# Patient Record
Sex: FEMALE | Race: WHITE | NOT HISPANIC OR LATINO | Employment: FULL TIME | URBAN - METROPOLITAN AREA
[De-identification: names, ages, dates, MRNs, and addresses within clinical notes are randomized per-mention and may not be internally consistent; named-entity substitution may affect disease eponyms.]

---

## 2023-04-26 DIAGNOSIS — K21.9 GASTROESOPHAGEAL REFLUX DISEASE WITHOUT ESOPHAGITIS: ICD-10-CM

## 2023-04-26 DIAGNOSIS — I10 HTN (HYPERTENSION), BENIGN: Primary | ICD-10-CM

## 2023-04-26 RX ORDER — OMEPRAZOLE 20 MG/1
20 CAPSULE, DELAYED RELEASE ORAL DAILY
Qty: 90 CAPSULE | Refills: 0 | Status: SHIPPED | OUTPATIENT
Start: 2023-04-26 | End: 2023-06-06 | Stop reason: SDUPTHER

## 2023-04-26 RX ORDER — AMLODIPINE AND BENAZEPRIL HYDROCHLORIDE 5; 20 MG/1; MG/1
1 CAPSULE ORAL DAILY
Qty: 90 CAPSULE | Refills: 0 | Status: SHIPPED | OUTPATIENT
Start: 2023-04-26 | End: 2023-06-06 | Stop reason: SDUPTHER

## 2023-06-06 DIAGNOSIS — I10 HTN (HYPERTENSION), BENIGN: ICD-10-CM

## 2023-06-06 DIAGNOSIS — K21.9 GASTROESOPHAGEAL REFLUX DISEASE WITHOUT ESOPHAGITIS: ICD-10-CM

## 2023-06-06 RX ORDER — AMLODIPINE AND BENAZEPRIL HYDROCHLORIDE 5; 20 MG/1; MG/1
1 CAPSULE ORAL DAILY
Qty: 90 CAPSULE | Refills: 0 | Status: SHIPPED | OUTPATIENT
Start: 2023-06-06 | End: 2023-07-24 | Stop reason: SDUPTHER

## 2023-06-06 RX ORDER — OMEPRAZOLE 20 MG/1
20 CAPSULE, DELAYED RELEASE ORAL DAILY
Qty: 90 CAPSULE | Refills: 0 | Status: SHIPPED | OUTPATIENT
Start: 2023-06-06 | End: 2023-07-24 | Stop reason: SDUPTHER

## 2023-07-24 ENCOUNTER — OFFICE VISIT (OUTPATIENT)
Dept: PRIMARY CARE | Facility: CLINIC | Age: 58
End: 2023-07-24
Payer: COMMERCIAL

## 2023-07-24 VITALS
BODY MASS INDEX: 36.98 KG/M2 | HEART RATE: 65 BPM | TEMPERATURE: 97.3 F | WEIGHT: 202.2 LBS | DIASTOLIC BLOOD PRESSURE: 88 MMHG | SYSTOLIC BLOOD PRESSURE: 138 MMHG

## 2023-07-24 DIAGNOSIS — R09.82 POSTNASAL DRIP: ICD-10-CM

## 2023-07-24 DIAGNOSIS — F41.9 ANXIETY DISORDER, UNSPECIFIED TYPE: ICD-10-CM

## 2023-07-24 DIAGNOSIS — I10 HTN (HYPERTENSION), BENIGN: Primary | ICD-10-CM

## 2023-07-24 DIAGNOSIS — G47.33 OSA (OBSTRUCTIVE SLEEP APNEA): ICD-10-CM

## 2023-07-24 DIAGNOSIS — Z12.31 ENCOUNTER FOR SCREENING MAMMOGRAM FOR BREAST CANCER: ICD-10-CM

## 2023-07-24 DIAGNOSIS — E78.5 HYPERLIPIDEMIA, UNSPECIFIED HYPERLIPIDEMIA TYPE: ICD-10-CM

## 2023-07-24 DIAGNOSIS — K21.9 GASTROESOPHAGEAL REFLUX DISEASE WITHOUT ESOPHAGITIS: ICD-10-CM

## 2023-07-24 PROBLEM — K44.9 HIATAL HERNIA: Status: ACTIVE | Noted: 2023-07-24

## 2023-07-24 PROBLEM — E55.9 VITAMIN D DEFICIENCY: Status: ACTIVE | Noted: 2023-07-24

## 2023-07-24 PROBLEM — M54.50 LUMBAR BACK PAIN: Status: RESOLVED | Noted: 2023-07-24 | Resolved: 2023-07-24

## 2023-07-24 PROBLEM — K21.00 GASTROESOPHAGEAL REFLUX DISEASE WITH ESOPHAGITIS WITHOUT HEMORRHAGE: Status: ACTIVE | Noted: 2023-07-24

## 2023-07-24 PROBLEM — H69.93 DISORDER OF BOTH EUSTACHIAN TUBES: Status: RESOLVED | Noted: 2023-07-24 | Resolved: 2023-07-24

## 2023-07-24 PROCEDURE — 99214 OFFICE O/P EST MOD 30 MIN: CPT | Performed by: NURSE PRACTITIONER

## 2023-07-24 PROCEDURE — 3079F DIAST BP 80-89 MM HG: CPT | Performed by: NURSE PRACTITIONER

## 2023-07-24 PROCEDURE — 1036F TOBACCO NON-USER: CPT | Performed by: NURSE PRACTITIONER

## 2023-07-24 PROCEDURE — 3075F SYST BP GE 130 - 139MM HG: CPT | Performed by: NURSE PRACTITIONER

## 2023-07-24 RX ORDER — FLUTICASONE PROPIONATE 50 MCG
2 SPRAY, SUSPENSION (ML) NASAL AS NEEDED
COMMUNITY
Start: 2021-10-25

## 2023-07-24 RX ORDER — SERTRALINE HYDROCHLORIDE 50 MG/1
50 TABLET, FILM COATED ORAL DAILY
Qty: 90 TABLET | Refills: 1 | Status: SHIPPED | OUTPATIENT
Start: 2023-07-24 | End: 2024-01-04

## 2023-07-24 RX ORDER — SERTRALINE HYDROCHLORIDE 25 MG/1
25 TABLET, FILM COATED ORAL DAILY
COMMUNITY
Start: 2023-06-01 | End: 2023-07-24 | Stop reason: DRUGHIGH

## 2023-07-24 RX ORDER — ATENOLOL 25 MG/1
2 TABLET ORAL DAILY
COMMUNITY
Start: 2016-08-04 | End: 2023-07-24 | Stop reason: DRUGHIGH

## 2023-07-24 RX ORDER — ATENOLOL 50 MG/1
50 TABLET ORAL DAILY
Qty: 90 TABLET | Refills: 1 | Status: SHIPPED | OUTPATIENT
Start: 2023-07-24 | End: 2024-01-04

## 2023-07-24 RX ORDER — AMLODIPINE AND BENAZEPRIL HYDROCHLORIDE 5; 20 MG/1; MG/1
1 CAPSULE ORAL DAILY
Qty: 90 CAPSULE | Refills: 1 | Status: SHIPPED | OUTPATIENT
Start: 2023-07-24 | End: 2024-01-04

## 2023-07-24 RX ORDER — OMEPRAZOLE 20 MG/1
20 CAPSULE, DELAYED RELEASE ORAL DAILY
Qty: 90 CAPSULE | Refills: 1 | Status: SHIPPED | OUTPATIENT
Start: 2023-07-24 | End: 2024-01-04

## 2023-07-24 NOTE — PROGRESS NOTES
Subjective   Patient ID: Dayanara Rosales is a 58 y.o. female who presents for Follow-up (BP check).    HPI  She presents to the office today for medication refills and follow up.   Overall feeling well.  Tolerating medication well at current dose- no side effects. No chest pain, shortness of breath, palpitations. No numbness, tingling, weakness or vision changes.  (+) edema in feet if standing for a long time. Goes away quickly  (+) mild headaches  No dizziness.  Last eye exam: Scheduled in August  Diet: Overall pretty healthy  Exercise: Walks 1-2 miles 3-4 days a week.   Weight: cannot lose weight    She reports she has been off the Sertraline for 2.5 weeks.  (+) feeling sad, down, helpless and hopeless- comes in waves  Has lost 3 family members over the past year which has been very difficult  Motivation could improve- depends on the day.  No SI or HI.  No excessive worry.  No worry about worst case scenarios  (+) panic attacks in the spring with loss of dad and brother.  Sleep: Not sleeping well- 4-5 hours a night due to mind racing and a cough  Caffeine use: 1 cup a day  Alcohol use: 2 drinks a week.  Drug use: None    She reports she had a URI with a cough a while back. The cough persists. It is a dry cough. It can be worse at night. She also has been clearing out a house and in a lot of dust and is not sure if that could also be a trigger.  No SOB or wheezing.  Last labs:   Lab Results   Component Value Date    GLUCOSE 90 10/26/2022    CALCIUM 9.3 10/26/2022     10/26/2022    K 4.3 10/26/2022    CO2 27 10/26/2022     (H) 10/26/2022    BUN 14 10/26/2022    CREATININE 0.77 10/26/2022      Lab Results   Component Value Date    WBC 8.4 10/26/2022    HGB 12.6 10/26/2022    HCT 40.5 10/26/2022    MCV 91 10/26/2022     10/26/2022      Lab Results   Component Value Date    CHOL 198 10/26/2022    CHOL 212 (H) 01/28/2019     Lab Results   Component Value Date    HDL 53.7 10/26/2022    HDL 59.5  "01/28/2019     No results found for: \"LDLCALC\"  Lab Results   Component Value Date    TRIG 113 10/26/2022    TRIG 135 01/28/2019     No components found for: \"CHOLHDL\"   Lab Results   Component Value Date    HGBA1C 5.8 (A) 10/26/2022        Review of Systems   Constitutional:  Negative for chills, fatigue and fever.   Eyes:  Negative for visual disturbance.   Respiratory:  Positive for cough. Negative for chest tightness and shortness of breath.    Cardiovascular:  Negative for chest pain, palpitations and leg swelling.   Gastrointestinal:  Negative for abdominal pain, diarrhea, nausea and vomiting.   Neurological:  Negative for dizziness, weakness, numbness and headaches.   Psychiatric/Behavioral:  Positive for dysphoric mood and sleep disturbance. Negative for self-injury and suicidal ideas. The patient is nervous/anxious.        Objective   /88   Pulse 65   Temp 36.3 °C (97.3 °F)   Wt 91.7 kg (202 lb 3.2 oz)   BMI 36.98 kg/m²     Physical Exam  Constitutional:       General: She is not in acute distress.     Appearance: Normal appearance. She is not toxic-appearing.   Eyes:      Extraocular Movements: Extraocular movements intact.      Conjunctiva/sclera: Conjunctivae normal.      Pupils: Pupils are equal, round, and reactive to light.   Neck:      Vascular: No carotid bruit.   Cardiovascular:      Rate and Rhythm: Normal rate and regular rhythm.      Pulses: Normal pulses.      Heart sounds: Normal heart sounds, S1 normal and S2 normal. No murmur heard.  Pulmonary:      Effort: Pulmonary effort is normal. No respiratory distress.      Breath sounds: Normal breath sounds.   Abdominal:      General: Bowel sounds are normal.      Palpations: Abdomen is soft.      Tenderness: There is no abdominal tenderness.   Musculoskeletal:      Right lower leg: No edema.      Left lower leg: No edema.   Lymphadenopathy:      Cervical: No cervical adenopathy.   Neurological:      Mental Status: She is alert and " oriented to person, place, and time.   Psychiatric:         Attention and Perception: Attention normal.         Mood and Affect: Mood and affect normal.         Behavior: Behavior normal. Behavior is cooperative.         Thought Content: Thought content normal.         Cognition and Memory: Cognition normal.         Judgment: Judgment normal.         Assessment/Plan   Problem List Items Addressed This Visit       Hypertension     Well controlled on the current medications. Continue.         Hyperlipidemia     Discussed a healthy diet and regular exercise. Check labs in the fall.         Gastroesophageal reflux disease with esophagitis without hemorrhage     Stable on current medications. Continue.         Anxiety disorder - Primary     Restart Sertraline today.         Relevant Medications    sertraline (Zoloft) 50 mg tablet    Postnasal drip     Continue Flonase and may add antihistamine. If dry cough persists may need to trial off the ACE inhibitor.         RJ (obstructive sleep apnea)     Referral to sleep medicine.         Relevant Orders    Referral to Adult Sleep Medicine     Other Visit Diagnoses       HTN (hypertension), benign        Relevant Medications    amLODIPine-benazepriL (Lotrel) 5-20 mg capsule    atenolol (Tenormin) 50 mg tablet    Gastroesophageal reflux disease without esophagitis        Relevant Medications    omeprazole (PriLOSEC) 20 mg DR capsule    Encounter for screening mammogram for breast cancer        Relevant Orders    BI mammo bilateral screening tomosynthesis                 It has been a pleasure seeing you today!

## 2023-07-24 NOTE — PATIENT INSTRUCTIONS
Focus on a low sodium/low fat diet (whole grains, fresh fruits and vegetables, lean meats). Avoid foods high in sugar.  Increase exercise as tolerated.  Continue medications at the current dose.  Referral to sleep medicine.  Follow up in 6 months or sooner if needed.

## 2023-07-26 ASSESSMENT — ENCOUNTER SYMPTOMS
PALPITATIONS: 0
FEVER: 0
NUMBNESS: 0
SLEEP DISTURBANCE: 1
COUGH: 1
NAUSEA: 0
ABDOMINAL PAIN: 0
SHORTNESS OF BREATH: 0
FATIGUE: 0
DIZZINESS: 0
DIARRHEA: 0
CHILLS: 0
NERVOUS/ANXIOUS: 1
HEADACHES: 0
CHEST TIGHTNESS: 0
DYSPHORIC MOOD: 1
VOMITING: 0
WEAKNESS: 0

## 2023-07-27 NOTE — ASSESSMENT & PLAN NOTE
Continue Flonase and may add antihistamine. If dry cough persists may need to trial off the ACE inhibitor.

## 2024-01-02 DIAGNOSIS — I10 HTN (HYPERTENSION), BENIGN: ICD-10-CM

## 2024-01-02 DIAGNOSIS — F41.9 ANXIETY DISORDER, UNSPECIFIED TYPE: ICD-10-CM

## 2024-01-02 DIAGNOSIS — K21.9 GASTROESOPHAGEAL REFLUX DISEASE WITHOUT ESOPHAGITIS: ICD-10-CM

## 2024-01-04 RX ORDER — OMEPRAZOLE 20 MG/1
20 CAPSULE, DELAYED RELEASE ORAL DAILY
Qty: 90 CAPSULE | Refills: 0 | Status: SHIPPED | OUTPATIENT
Start: 2024-01-04 | End: 2024-03-05 | Stop reason: SDUPTHER

## 2024-01-04 RX ORDER — SERTRALINE HYDROCHLORIDE 50 MG/1
50 TABLET, FILM COATED ORAL DAILY
Qty: 90 TABLET | Refills: 0 | Status: SHIPPED | OUTPATIENT
Start: 2024-01-04 | End: 2024-03-05 | Stop reason: SDUPTHER

## 2024-01-04 RX ORDER — ATENOLOL 50 MG/1
50 TABLET ORAL DAILY
Qty: 90 TABLET | Refills: 0 | Status: SHIPPED | OUTPATIENT
Start: 2024-01-04 | End: 2024-03-05 | Stop reason: SDUPTHER

## 2024-01-04 RX ORDER — AMLODIPINE AND BENAZEPRIL HYDROCHLORIDE 5; 20 MG/1; MG/1
1 CAPSULE ORAL DAILY
Qty: 90 CAPSULE | Refills: 0 | Status: SHIPPED | OUTPATIENT
Start: 2024-01-04 | End: 2024-03-05 | Stop reason: ALTCHOICE

## 2024-03-04 PROBLEM — R73.9 HYPERGLYCEMIA: Status: ACTIVE | Noted: 2024-03-04

## 2024-03-05 ENCOUNTER — OFFICE VISIT (OUTPATIENT)
Dept: PRIMARY CARE | Facility: CLINIC | Age: 59
End: 2024-03-05
Payer: COMMERCIAL

## 2024-03-05 VITALS
HEART RATE: 60 BPM | WEIGHT: 207.6 LBS | DIASTOLIC BLOOD PRESSURE: 92 MMHG | TEMPERATURE: 97.3 F | SYSTOLIC BLOOD PRESSURE: 149 MMHG | HEIGHT: 65 IN | BODY MASS INDEX: 34.59 KG/M2 | OXYGEN SATURATION: 95 %

## 2024-03-05 DIAGNOSIS — E78.5 HYPERLIPIDEMIA, UNSPECIFIED HYPERLIPIDEMIA TYPE: ICD-10-CM

## 2024-03-05 DIAGNOSIS — K21.9 GASTROESOPHAGEAL REFLUX DISEASE WITHOUT ESOPHAGITIS: ICD-10-CM

## 2024-03-05 DIAGNOSIS — I10 HYPERTENSION, UNSPECIFIED TYPE: Primary | ICD-10-CM

## 2024-03-05 DIAGNOSIS — Z12.31 ENCOUNTER FOR SCREENING MAMMOGRAM FOR BREAST CANCER: ICD-10-CM

## 2024-03-05 DIAGNOSIS — F41.9 ANXIETY DISORDER, UNSPECIFIED TYPE: ICD-10-CM

## 2024-03-05 DIAGNOSIS — R73.03 PREDIABETES: ICD-10-CM

## 2024-03-05 DIAGNOSIS — E55.9 VITAMIN D DEFICIENCY: ICD-10-CM

## 2024-03-05 PROCEDURE — 99214 OFFICE O/P EST MOD 30 MIN: CPT | Performed by: NURSE PRACTITIONER

## 2024-03-05 PROCEDURE — 1036F TOBACCO NON-USER: CPT | Performed by: NURSE PRACTITIONER

## 2024-03-05 PROCEDURE — 3077F SYST BP >= 140 MM HG: CPT | Performed by: NURSE PRACTITIONER

## 2024-03-05 PROCEDURE — 3080F DIAST BP >= 90 MM HG: CPT | Performed by: NURSE PRACTITIONER

## 2024-03-05 RX ORDER — OMEPRAZOLE 20 MG/1
20 CAPSULE, DELAYED RELEASE ORAL DAILY
Qty: 90 CAPSULE | Refills: 1 | Status: SHIPPED | OUTPATIENT
Start: 2024-03-05

## 2024-03-05 RX ORDER — ATENOLOL 50 MG/1
50 TABLET ORAL DAILY
Qty: 90 TABLET | Refills: 1 | Status: SHIPPED | OUTPATIENT
Start: 2024-03-05

## 2024-03-05 RX ORDER — SERTRALINE HYDROCHLORIDE 50 MG/1
50 TABLET, FILM COATED ORAL DAILY
Qty: 90 TABLET | Refills: 3 | Status: SHIPPED | OUTPATIENT
Start: 2024-03-05

## 2024-03-05 RX ORDER — BENAZEPRIL HYDROCHLORIDE 20 MG/1
20 TABLET ORAL DAILY
Qty: 90 TABLET | Refills: 0 | Status: SHIPPED | OUTPATIENT
Start: 2024-03-05 | End: 2024-05-30 | Stop reason: SDUPTHER

## 2024-03-05 RX ORDER — FEXOFENADINE HCL 60 MG
60 TABLET ORAL AS NEEDED
COMMUNITY

## 2024-03-05 RX ORDER — AMLODIPINE BESYLATE 5 MG/1
5 TABLET ORAL DAILY
Qty: 90 TABLET | Refills: 0 | Status: SHIPPED | OUTPATIENT
Start: 2024-03-05 | End: 2024-05-30 | Stop reason: SDUPTHER

## 2024-03-05 ASSESSMENT — ENCOUNTER SYMPTOMS
NAUSEA: 0
ABDOMINAL PAIN: 0
CHEST TIGHTNESS: 0
SHORTNESS OF BREATH: 0
NUMBNESS: 0
CHILLS: 0
FATIGUE: 0
PALPITATIONS: 0
VOMITING: 0
DIARRHEA: 0
WEAKNESS: 0
COUGH: 0
FEVER: 0
DIZZINESS: 0
HEADACHES: 0

## 2024-03-05 NOTE — PROGRESS NOTES
"Subjective    Dayanara Rosales is a 58 y.o. female who presents for Follow-up (6 month bp check).    HPI  She presents to the office today for medication refills and follow up.  She is tolerating medication well at current dose- no side effects. No chest pain, shortness of breath, palpitations or edema. No headaches, numbness, tingling, weakness or vision changes.  No dizziness.  Diet: \"horrible\"  cooking dinner and not always healthy  Exercise: Walks 2-3 times a week 1-2 miles  Weight: up 5 lbs    Taking the Sertraline as prescribed. Taking daily at night.  No side effects.  No feeling down, helpless or hopeless.  Feeling sad at times.  This is mostly situational.  Recently sold her parents house.  Both her kids have moved out of the house.  Motivation is not great.  No SI or HI.  (+) excessive worry about FDC and kids being on their own.  (+) worry about worst case scenarios.  No panic attacks.  Tried to wean off the Sertraline in the fall and got very easily agitated and snippy  Sleep: No issues falling asleep or staying asleep  Caffeine use: 1 cup of coffee a day  Alcohol use: 1-2 glasses of wine a week  Drug use: None    Last labs:     Review of Systems   Constitutional:  Negative for chills, fatigue and fever.   Eyes:  Negative for visual disturbance.   Respiratory:  Negative for cough, chest tightness and shortness of breath.    Cardiovascular:  Negative for chest pain, palpitations and leg swelling.   Gastrointestinal:  Negative for abdominal pain, diarrhea, nausea and vomiting.   Neurological:  Negative for dizziness, weakness, numbness and headaches.       Objective   BP (!) 149/92   Pulse 60   Temp 36.3 °C (97.3 °F)   Ht 1.638 m (5' 4.5\")   Wt 94.2 kg (207 lb 9.6 oz)   SpO2 95%   BMI 35.08 kg/m²     Physical Exam  Constitutional:       General: She is not in acute distress.     Appearance: Normal appearance. She is not toxic-appearing.   Eyes:      Extraocular Movements: " Extraocular movements intact.      Conjunctiva/sclera: Conjunctivae normal.      Pupils: Pupils are equal, round, and reactive to light.   Neck:      Vascular: No carotid bruit.   Cardiovascular:      Rate and Rhythm: Normal rate and regular rhythm.      Pulses: Normal pulses.      Heart sounds: Normal heart sounds, S1 normal and S2 normal. No murmur heard.  Pulmonary:      Effort: Pulmonary effort is normal. No respiratory distress.      Breath sounds: Normal breath sounds.   Abdominal:      General: Bowel sounds are normal.      Palpations: Abdomen is soft.      Tenderness: There is no abdominal tenderness.   Musculoskeletal:      Right lower leg: No edema.      Left lower leg: No edema.   Lymphadenopathy:      Cervical: No cervical adenopathy.   Neurological:      Mental Status: She is alert and oriented to person, place, and time.   Psychiatric:         Attention and Perception: Attention normal.         Mood and Affect: Mood and affect normal.         Behavior: Behavior normal. Behavior is cooperative.         Thought Content: Thought content normal.         Cognition and Memory: Cognition normal.         Judgment: Judgment normal.         Assessment/Plan   Problem List Items Addressed This Visit       Hypertension     Too high today.  Will check home BP readings.  If remains elevated, will increase amlodipine to 10 mg daily.         Relevant Medications    amLODIPine (Norvasc) 5 mg tablet    benazepril (Lotensin) 20 mg tablet    atenolol (Tenormin) 50 mg tablet    Other Relevant Orders    Comprehensive Metabolic Panel    CBC    Lipid Panel    Hyperlipidemia     Check FLP.  Focus on healthy diet and exercise.         Anxiety disorder     Stable on the current dose of Sertraline.         Relevant Medications    sertraline (Zoloft) 50 mg tablet    Vitamin D deficiency     Check vitamin D level.         Relevant Orders    Vitamin D 25-Hydroxy,Total (for eval of Vitamin D levels)    Prediabetes     Continue focus  on healthy diet and exercise.  Check updated hemoglobin A1c.         Relevant Orders    Hemoglobin A1C    Gastroesophageal reflux disease without esophagitis    Relevant Medications    omeprazole (PriLOSEC) 20 mg DR capsule     Other Visit Diagnoses       Encounter for screening mammogram for breast cancer    -  Primary    Relevant Orders    BI mammo bilateral screening tomosynthesis            It has been a pleasure seeing you today!

## 2024-03-06 NOTE — ASSESSMENT & PLAN NOTE
Too high today.  Will check home BP readings.  If remains elevated, will increase amlodipine to 10 mg daily.

## 2024-03-08 ENCOUNTER — LAB (OUTPATIENT)
Dept: LAB | Facility: LAB | Age: 59
End: 2024-03-08
Payer: COMMERCIAL

## 2024-03-08 DIAGNOSIS — E55.9 VITAMIN D DEFICIENCY: ICD-10-CM

## 2024-03-08 DIAGNOSIS — R73.03 PREDIABETES: ICD-10-CM

## 2024-03-08 DIAGNOSIS — I10 HYPERTENSION, UNSPECIFIED TYPE: ICD-10-CM

## 2024-03-08 LAB
25(OH)D3 SERPL-MCNC: 26 NG/ML (ref 30–100)
ALBUMIN SERPL BCP-MCNC: 4.3 G/DL (ref 3.4–5)
ALP SERPL-CCNC: 77 U/L (ref 33–110)
ALT SERPL W P-5'-P-CCNC: 22 U/L (ref 7–45)
ANION GAP SERPL CALC-SCNC: 12 MMOL/L (ref 10–20)
AST SERPL W P-5'-P-CCNC: 20 U/L (ref 9–39)
BILIRUB SERPL-MCNC: 0.6 MG/DL (ref 0–1.2)
BUN SERPL-MCNC: 15 MG/DL (ref 6–23)
CALCIUM SERPL-MCNC: 9.3 MG/DL (ref 8.6–10.3)
CHLORIDE SERPL-SCNC: 108 MMOL/L (ref 98–107)
CHOLEST SERPL-MCNC: 231 MG/DL (ref 0–199)
CHOLESTEROL/HDL RATIO: 4.1
CO2 SERPL-SCNC: 25 MMOL/L (ref 21–32)
CREAT SERPL-MCNC: 0.66 MG/DL (ref 0.5–1.05)
EGFRCR SERPLBLD CKD-EPI 2021: >90 ML/MIN/1.73M*2
ERYTHROCYTE [DISTWIDTH] IN BLOOD BY AUTOMATED COUNT: 12.8 % (ref 11.5–14.5)
EST. AVERAGE GLUCOSE BLD GHB EST-MCNC: 137 MG/DL
GLUCOSE SERPL-MCNC: 102 MG/DL (ref 74–99)
HBA1C MFR BLD: 6.4 %
HCT VFR BLD AUTO: 41.9 % (ref 36–46)
HDLC SERPL-MCNC: 55.7 MG/DL
HGB BLD-MCNC: 13.2 G/DL (ref 12–16)
LDLC SERPL CALC-MCNC: 139 MG/DL
MCH RBC QN AUTO: 28 PG (ref 26–34)
MCHC RBC AUTO-ENTMCNC: 31.5 G/DL (ref 32–36)
MCV RBC AUTO: 89 FL (ref 80–100)
NON HDL CHOLESTEROL: 175 MG/DL (ref 0–149)
NRBC BLD-RTO: 0 /100 WBCS (ref 0–0)
PLATELET # BLD AUTO: 299 X10*3/UL (ref 150–450)
POTASSIUM SERPL-SCNC: 4.3 MMOL/L (ref 3.5–5.3)
PROT SERPL-MCNC: 7.1 G/DL (ref 6.4–8.2)
RBC # BLD AUTO: 4.71 X10*6/UL (ref 4–5.2)
SODIUM SERPL-SCNC: 141 MMOL/L (ref 136–145)
TRIGL SERPL-MCNC: 181 MG/DL (ref 0–149)
VLDL: 36 MG/DL (ref 0–40)
WBC # BLD AUTO: 8.4 X10*3/UL (ref 4.4–11.3)

## 2024-03-08 PROCEDURE — 80053 COMPREHEN METABOLIC PANEL: CPT

## 2024-03-08 PROCEDURE — 85027 COMPLETE CBC AUTOMATED: CPT

## 2024-03-08 PROCEDURE — 36415 COLL VENOUS BLD VENIPUNCTURE: CPT

## 2024-03-08 PROCEDURE — 82306 VITAMIN D 25 HYDROXY: CPT

## 2024-03-08 PROCEDURE — 80061 LIPID PANEL: CPT

## 2024-03-08 PROCEDURE — 83036 HEMOGLOBIN GLYCOSYLATED A1C: CPT

## 2024-03-15 ENCOUNTER — TELEPHONE (OUTPATIENT)
Dept: PRIMARY CARE | Facility: CLINIC | Age: 59
End: 2024-03-15
Payer: COMMERCIAL

## 2024-03-15 NOTE — TELEPHONE ENCOUNTER
Per HIPAA ok to leave detailed message, LM on pt voicemail with results. She needs to call us back or sent a [employer] message regarding this information.

## 2024-03-15 NOTE — TELEPHONE ENCOUNTER
----- Message from HAILEE Pena-CNP sent at 3/14/2024  1:58 PM EDT -----  Please let her know her cholesterol has trended up from last year-triglycerides are elevated at 181.  Total cholesterol and LDL are also trending upwards.  Her hemoglobin A1c (3-month blood sugar average is 6.4 which has trended up quite a bit as well (last year was 5.8).  6.5 is considered diabetic.  Continue to focus on a low-carb and low sugar diet as well as regular exercise as we discussed at the visit.  Recommend rechecking in 6 months.  Her CBC and CMP looked good.  Her vitamin D level was a little bit low at 26.  How much vitamin D supplementation is she currently taking?

## 2024-03-25 ENCOUNTER — HOSPITAL ENCOUNTER (OUTPATIENT)
Dept: RADIOLOGY | Facility: CLINIC | Age: 59
Discharge: HOME | End: 2024-03-25
Payer: COMMERCIAL

## 2024-03-25 VITALS — BODY MASS INDEX: 35.34 KG/M2 | HEIGHT: 64 IN | WEIGHT: 207 LBS

## 2024-03-25 DIAGNOSIS — Z12.31 ENCOUNTER FOR SCREENING MAMMOGRAM FOR BREAST CANCER: ICD-10-CM

## 2024-03-25 PROCEDURE — 77067 SCR MAMMO BI INCL CAD: CPT | Performed by: RADIOLOGY

## 2024-03-25 PROCEDURE — 77067 SCR MAMMO BI INCL CAD: CPT

## 2024-03-25 PROCEDURE — 77063 BREAST TOMOSYNTHESIS BI: CPT | Performed by: RADIOLOGY

## 2024-05-30 DIAGNOSIS — I10 HYPERTENSION, UNSPECIFIED TYPE: ICD-10-CM

## 2024-05-30 RX ORDER — BENAZEPRIL HYDROCHLORIDE 20 MG/1
20 TABLET ORAL DAILY
Qty: 90 TABLET | Refills: 0 | Status: SHIPPED | OUTPATIENT
Start: 2024-05-30

## 2024-05-30 RX ORDER — AMLODIPINE BESYLATE 5 MG/1
5 TABLET ORAL DAILY
Qty: 90 TABLET | Refills: 0 | Status: SHIPPED | OUTPATIENT
Start: 2024-05-30

## 2024-05-30 RX ORDER — AMLODIPINE BESYLATE 5 MG/1
5 TABLET ORAL DAILY
Qty: 90 TABLET | Refills: 0 | Status: SHIPPED | OUTPATIENT
Start: 2024-05-30 | End: 2024-05-30 | Stop reason: SDUPTHER

## 2024-05-30 RX ORDER — BENAZEPRIL HYDROCHLORIDE 20 MG/1
20 TABLET ORAL DAILY
Qty: 90 TABLET | Refills: 0 | Status: SHIPPED | OUTPATIENT
Start: 2024-05-30 | End: 2024-05-30 | Stop reason: SDUPTHER

## 2024-08-30 ENCOUNTER — TELEPHONE (OUTPATIENT)
Dept: PRIMARY CARE | Facility: CLINIC | Age: 59
End: 2024-08-30
Payer: COMMERCIAL

## 2024-08-30 NOTE — TELEPHONE ENCOUNTER
Pt called stating that she has an appt on 9/13 but she recently has had more pressure in her back and began spotting about a week ago. She is nervous it is something serious and would like to know if it is ok to wait until 9/13 or if she should be seen sooner?

## 2024-09-12 ASSESSMENT — ENCOUNTER SYMPTOMS
HEMATURIA: 1
BACK PAIN: 1
FLANK PAIN: 1

## 2024-09-13 ENCOUNTER — TELEPHONE (OUTPATIENT)
Dept: OBSTETRICS AND GYNECOLOGY | Facility: CLINIC | Age: 59
End: 2024-09-13

## 2024-09-13 ENCOUNTER — APPOINTMENT (OUTPATIENT)
Dept: PRIMARY CARE | Facility: CLINIC | Age: 59
End: 2024-09-13
Payer: COMMERCIAL

## 2024-09-13 VITALS
OXYGEN SATURATION: 97 % | WEIGHT: 203 LBS | TEMPERATURE: 97.6 F | HEART RATE: 58 BPM | BODY MASS INDEX: 34.32 KG/M2 | DIASTOLIC BLOOD PRESSURE: 102 MMHG | SYSTOLIC BLOOD PRESSURE: 190 MMHG

## 2024-09-13 DIAGNOSIS — M54.6 CHRONIC MIDLINE THORACIC BACK PAIN: ICD-10-CM

## 2024-09-13 DIAGNOSIS — R10.2 SUPRAPUBIC PRESSURE: ICD-10-CM

## 2024-09-13 DIAGNOSIS — R73.03 PREDIABETES: ICD-10-CM

## 2024-09-13 DIAGNOSIS — G89.29 CHRONIC MIDLINE THORACIC BACK PAIN: ICD-10-CM

## 2024-09-13 DIAGNOSIS — Z23 FLU VACCINE NEED: ICD-10-CM

## 2024-09-13 DIAGNOSIS — E78.5 HYPERLIPIDEMIA, UNSPECIFIED HYPERLIPIDEMIA TYPE: ICD-10-CM

## 2024-09-13 DIAGNOSIS — N95.0 POSTMENOPAUSAL BLEEDING: Primary | ICD-10-CM

## 2024-09-13 DIAGNOSIS — I10 HYPERTENSION, UNSPECIFIED TYPE: ICD-10-CM

## 2024-09-13 DIAGNOSIS — N89.8 VAGINAL ITCHING: ICD-10-CM

## 2024-09-13 LAB
POC APPEARANCE, URINE: CLEAR
POC BILIRUBIN, URINE: NEGATIVE
POC BLOOD, URINE: ABNORMAL
POC COLOR, URINE: YELLOW
POC GLUCOSE, URINE: NEGATIVE MG/DL
POC KETONES, URINE: NEGATIVE MG/DL
POC LEUKOCYTES, URINE: NEGATIVE
POC NITRITE,URINE: NEGATIVE
POC PH, URINE: 5.5 PH
POC PROTEIN, URINE: NEGATIVE MG/DL
POC SPECIFIC GRAVITY, URINE: >=1.03
POC UROBILINOGEN, URINE: 0.2 EU/DL

## 2024-09-13 PROCEDURE — 3077F SYST BP >= 140 MM HG: CPT | Performed by: NURSE PRACTITIONER

## 2024-09-13 PROCEDURE — 90656 IIV3 VACC NO PRSV 0.5 ML IM: CPT | Performed by: NURSE PRACTITIONER

## 2024-09-13 PROCEDURE — 99214 OFFICE O/P EST MOD 30 MIN: CPT | Performed by: NURSE PRACTITIONER

## 2024-09-13 PROCEDURE — 81003 URINALYSIS AUTO W/O SCOPE: CPT | Performed by: NURSE PRACTITIONER

## 2024-09-13 PROCEDURE — 1036F TOBACCO NON-USER: CPT | Performed by: NURSE PRACTITIONER

## 2024-09-13 PROCEDURE — 90471 IMMUNIZATION ADMIN: CPT | Performed by: NURSE PRACTITIONER

## 2024-09-13 PROCEDURE — 87205 SMEAR GRAM STAIN: CPT

## 2024-09-13 PROCEDURE — 3080F DIAST BP >= 90 MM HG: CPT | Performed by: NURSE PRACTITIONER

## 2024-09-13 PROCEDURE — 87086 URINE CULTURE/COLONY COUNT: CPT

## 2024-09-13 RX ORDER — AMLODIPINE BESYLATE 5 MG/1
5 TABLET ORAL DAILY
Qty: 90 TABLET | Refills: 0 | Status: SHIPPED | OUTPATIENT
Start: 2024-09-13

## 2024-09-13 RX ORDER — BENAZEPRIL HYDROCHLORIDE 20 MG/1
20 TABLET ORAL DAILY
Qty: 90 TABLET | Refills: 0 | Status: SHIPPED | OUTPATIENT
Start: 2024-09-13

## 2024-09-13 ASSESSMENT — ENCOUNTER SYMPTOMS
WEAKNESS: 0
FATIGUE: 0
CHILLS: 0
SHORTNESS OF BREATH: 0
NUMBNESS: 0
NAUSEA: 0
HEADACHES: 0
FEVER: 0
DIZZINESS: 0
BACK PAIN: 1
ABDOMINAL PAIN: 1
COUGH: 0
HEMATURIA: 1
PALPITATIONS: 0
DIARRHEA: 0
FLANK PAIN: 1
CHEST TIGHTNESS: 0
VOMITING: 0

## 2024-09-13 ASSESSMENT — PATIENT HEALTH QUESTIONNAIRE - PHQ9
1. LITTLE INTEREST OR PLEASURE IN DOING THINGS: NOT AT ALL
SUM OF ALL RESPONSES TO PHQ9 QUESTIONS 1 AND 2: 0
2. FEELING DOWN, DEPRESSED OR HOPELESS: NOT AT ALL

## 2024-09-13 NOTE — PROGRESS NOTES
"Subjective    Dayanara SHOAN Román Rosales \"Carrie\" is a 59 y.o. female who presents for Back Pain, Abdominal Pain (Pt has pain suprapubic area and recently started spotting blood. [facility] and her urine was negative per pt.), Flu Vaccine (Pt would like today and all screening questions were answered. /), and Med Refill.    Female  Problem  The patient's primary symptoms include vaginal bleeding. This is a new problem. The current episode started 1 to 4 weeks ago. The problem occurs 2 to 4 times per day. The problem has been gradually worsening. The pain is moderate. The problem affects both sides. She is not pregnant. Associated symptoms include abdominal pain, back pain, discolored urine, flank pain and hematuria. Pertinent negatives include no chills, diarrhea, fever, headaches, nausea or vomiting. The vaginal bleeding is spotting. She has not been passing clots. She has not been passing tissue. Nothing aggravates the symptoms. She is not sexually active. No, her partner does not have an STD. She uses nothing for contraception. She is postmenopausal.     She presents to the office today for evaluation of mid back pain. She reports a pressure in the middle of the back. This has been present since about May.  No pain noted at all unless she is leaning back in a chair. When sitting or standing it is fine.  The discomfort does not keep her up at night.   \"Feels like sheets are bunched up underneath her.\"  No significant pain or sharp pain noted.  No radiation of pain down the legs.  Does not wrap around to abdomen.  Has noticed some pain in the middle fingers bilaterally- \"pinching sensation.\"    She reports she has some cramping and bloating noted to the lower abdomen.   On 8/19/2024 noticed some vaginal bleeding. It is pink and bright red at times.  (+) vaginal itching. No unusual discharge.  No urinary burning, frequency or urgency.  No change in bowels.   No fever or chills.   (+) night " sweats (typical).  Not sexually active.   LMP 9/2019  Follows with GYN. Last PAP 2 years ago- normal (6/2022)    Her BP is elevated today. She reports she has been out of 2 of her medications for several days now. She is tolerating medications well at current dose- no side effects. No chest pain, shortness of breath, palpitations or edema. No headaches, numbness, tingling, weakness or vision changes.  No dizziness.  Home blood pressure readings: Have been good at home since adding Amlodipine.    Last labs: ordered today.    Review of Systems   Constitutional:  Negative for chills, fatigue and fever.   Eyes:  Negative for visual disturbance.   Respiratory:  Negative for cough, chest tightness and shortness of breath.    Cardiovascular:  Negative for chest pain, palpitations and leg swelling.   Gastrointestinal:  Positive for abdominal pain. Negative for diarrhea, nausea and vomiting.   Genitourinary:  Positive for flank pain and hematuria.   Musculoskeletal:  Positive for back pain.   Neurological:  Negative for dizziness, weakness, numbness and headaches.     Objective   BP (!) 190/102   Pulse 58   Temp 36.4 °C (97.6 °F) (Temporal)   Wt 92.1 kg (203 lb)   SpO2 97%   BMI 34.32 kg/m²     Physical Exam  Constitutional:       General: She is not in acute distress.     Appearance: Normal appearance. She is not toxic-appearing.   Eyes:      Extraocular Movements: Extraocular movements intact.      Conjunctiva/sclera: Conjunctivae normal.      Pupils: Pupils are equal, round, and reactive to light.   Neck:      Vascular: No carotid bruit.   Cardiovascular:      Rate and Rhythm: Normal rate and regular rhythm.      Pulses: Normal pulses.      Heart sounds: Normal heart sounds, S1 normal and S2 normal. No murmur heard.  Pulmonary:      Effort: Pulmonary effort is normal. No respiratory distress.      Breath sounds: Normal breath sounds.   Abdominal:      General: Bowel sounds are normal.      Palpations: Abdomen is  soft.      Tenderness: There is no abdominal tenderness. There is no right CVA tenderness, left CVA tenderness, guarding or rebound.      Comments: (+) slight tenderness noted suprapubic and right lower quadrant.   Genitourinary:     Comments: (+) light brown clear discharge noted.   No bright red blood noted.   No significant erythema noted.     (+) tenderness noted Right lower abdomen and suprapubically.  Musculoskeletal:      Thoracic back: No tenderness or bony tenderness. Normal range of motion.      Lumbar back: Negative right straight leg raise test and negative left straight leg raise test.      Right lower leg: No edema.      Left lower leg: No edema.   Lymphadenopathy:      Cervical: No cervical adenopathy.   Neurological:      Mental Status: She is alert and oriented to person, place, and time.      Sensory: Sensation is intact.      Motor: Motor function is intact.      Deep Tendon Reflexes: Reflexes are normal and symmetric.   Psychiatric:         Attention and Perception: Attention normal.         Mood and Affect: Mood and affect normal.         Behavior: Behavior normal. Behavior is cooperative.         Thought Content: Thought content normal.         Cognition and Memory: Cognition normal.         Judgment: Judgment normal.         Assessment/Plan   Problem List Items Addressed This Visit       Hypertension     Elevated today.  She has been off her medications for several days.  Refills of all medications sent to the pharmacy.  She is to keep me posted on home BP readings.  Check updated labs.         Relevant Medications    amLODIPine (Norvasc) 5 mg tablet    benazepril (Lotensin) 20 mg tablet    Other Relevant Orders    Comprehensive Metabolic Panel    CBC    Lipid Panel    Hyperlipidemia     Check updated FLP.         Prediabetes     Check updated hemoglobin A1c.         Relevant Orders    Hemoglobin A1C     Other Visit Diagnoses       Postmenopausal bleeding    -  Primary    Relevant Orders    US  pelvis transvaginal    Flu vaccine need        Relevant Orders    Flu vaccine, trivalent, preservative free, age 6 months and greater (Fluarix/Fluzone/Flulaval) (Completed)    Vaginal itching        Relevant Orders    Vaginitis Gram Stain For Bacterial Vaginosis + Yeast    Suprapubic pressure        Relevant Orders    POCT UA Automated manually resulted (Completed)    Urine Culture    Chronic midline thoracic back pain            Monitor for now. No pain on palpation.     It has been a pleasure seeing you today!

## 2024-09-13 NOTE — TELEPHONE ENCOUNTER
Patient saw her PCP, Desi Morris, and states that she has been having PMB (light spotting) for 3 weeks. Her PCP said she needs to come see her GYN which she last saw Mary in 2022, and get an ultrasound, but the PCP did not order one. Please advise on next steps that patient needs to take. Not sure if Mary would like to see her, or if she should see a Doc after an ultrasound.

## 2024-09-14 NOTE — ASSESSMENT & PLAN NOTE
Elevated today.  She has been off her medications for several days.  Refills of all medications sent to the pharmacy.  She is to keep me posted on home BP readings.  Check updated labs.

## 2024-09-15 LAB — BACTERIA UR CULT: NORMAL

## 2024-09-16 LAB
CLUE CELLS VAG LPF-#/AREA: NORMAL /[LPF]
NUGENT SCORE: 1
YEAST VAG WET PREP-#/AREA: NORMAL

## 2024-09-16 NOTE — TELEPHONE ENCOUNTER
Agree with plan.  If endometrial lining is thin can follow with us, if thick will need biopsy and evaluation with physician.

## 2024-09-16 NOTE — TELEPHONE ENCOUNTER
I spoke with patient, states she has not had a period since 2019, she started to experience vaginal spotting 3 weeks ago and is also having cramping. Patient states no pain with urination or urine frequency. Desi Morris did an exam in office and mentioned to patient it could be a possible prolapse. Ultrasound was ordered by her PCP and she is getting ultrasound done as soon as possible.   I informed patient I thought it would be best to wait for the results of the ultrasound to determine what type of appointment is necessary and which provider she should follow up with. Patient agrees.

## 2024-09-17 ENCOUNTER — LAB (OUTPATIENT)
Dept: LAB | Facility: LAB | Age: 59
End: 2024-09-17
Payer: COMMERCIAL

## 2024-09-17 DIAGNOSIS — R73.03 PREDIABETES: ICD-10-CM

## 2024-09-17 DIAGNOSIS — I10 HYPERTENSION, UNSPECIFIED TYPE: ICD-10-CM

## 2024-09-17 LAB
ALBUMIN SERPL BCP-MCNC: 4.1 G/DL (ref 3.4–5)
ALP SERPL-CCNC: 67 U/L (ref 33–110)
ALT SERPL W P-5'-P-CCNC: 12 U/L (ref 7–45)
ANION GAP SERPL CALC-SCNC: 12 MMOL/L (ref 10–20)
AST SERPL W P-5'-P-CCNC: 13 U/L (ref 9–39)
BILIRUB SERPL-MCNC: 0.4 MG/DL (ref 0–1.2)
BUN SERPL-MCNC: 12 MG/DL (ref 6–23)
CALCIUM SERPL-MCNC: 8.7 MG/DL (ref 8.6–10.3)
CHLORIDE SERPL-SCNC: 108 MMOL/L (ref 98–107)
CHOLEST SERPL-MCNC: 205 MG/DL (ref 0–199)
CHOLESTEROL/HDL RATIO: 4.1
CO2 SERPL-SCNC: 26 MMOL/L (ref 21–32)
CREAT SERPL-MCNC: 0.65 MG/DL (ref 0.5–1.05)
EGFRCR SERPLBLD CKD-EPI 2021: >90 ML/MIN/1.73M*2
ERYTHROCYTE [DISTWIDTH] IN BLOOD BY AUTOMATED COUNT: 12.8 % (ref 11.5–14.5)
EST. AVERAGE GLUCOSE BLD GHB EST-MCNC: 123 MG/DL
GLUCOSE SERPL-MCNC: 101 MG/DL (ref 74–99)
HBA1C MFR BLD: 5.9 %
HCT VFR BLD AUTO: 38.7 % (ref 36–46)
HDLC SERPL-MCNC: 50.1 MG/DL
HGB BLD-MCNC: 12.2 G/DL (ref 12–16)
LDLC SERPL CALC-MCNC: 121 MG/DL
MCH RBC QN AUTO: 28.3 PG (ref 26–34)
MCHC RBC AUTO-ENTMCNC: 31.5 G/DL (ref 32–36)
MCV RBC AUTO: 90 FL (ref 80–100)
NON HDL CHOLESTEROL: 155 MG/DL (ref 0–149)
NRBC BLD-RTO: 0 /100 WBCS (ref 0–0)
PLATELET # BLD AUTO: 281 X10*3/UL (ref 150–450)
POTASSIUM SERPL-SCNC: 3.8 MMOL/L (ref 3.5–5.3)
PROT SERPL-MCNC: 6.5 G/DL (ref 6.4–8.2)
RBC # BLD AUTO: 4.31 X10*6/UL (ref 4–5.2)
SODIUM SERPL-SCNC: 142 MMOL/L (ref 136–145)
TRIGL SERPL-MCNC: 169 MG/DL (ref 0–149)
VLDL: 34 MG/DL (ref 0–40)
WBC # BLD AUTO: 8.3 X10*3/UL (ref 4.4–11.3)

## 2024-09-17 PROCEDURE — 36415 COLL VENOUS BLD VENIPUNCTURE: CPT

## 2024-09-17 PROCEDURE — 80061 LIPID PANEL: CPT

## 2024-09-17 PROCEDURE — 80053 COMPREHEN METABOLIC PANEL: CPT

## 2024-09-17 PROCEDURE — 83036 HEMOGLOBIN GLYCOSYLATED A1C: CPT

## 2024-09-17 PROCEDURE — 85027 COMPLETE CBC AUTOMATED: CPT

## 2024-09-19 ENCOUNTER — HOSPITAL ENCOUNTER (OUTPATIENT)
Dept: RADIOLOGY | Facility: CLINIC | Age: 59
Discharge: HOME | End: 2024-09-19
Payer: COMMERCIAL

## 2024-09-19 DIAGNOSIS — N95.0 POSTMENOPAUSAL BLEEDING: ICD-10-CM

## 2024-09-19 PROCEDURE — 76830 TRANSVAGINAL US NON-OB: CPT

## 2024-09-24 DIAGNOSIS — K76.0 FATTY LIVER: Primary | ICD-10-CM

## 2024-09-30 ENCOUNTER — HOSPITAL ENCOUNTER (OUTPATIENT)
Dept: RADIOLOGY | Facility: CLINIC | Age: 59
Discharge: HOME | End: 2024-09-30
Payer: COMMERCIAL

## 2024-09-30 DIAGNOSIS — K76.0 FATTY LIVER: ICD-10-CM

## 2024-09-30 PROCEDURE — 76705 ECHO EXAM OF ABDOMEN: CPT

## 2024-09-30 PROCEDURE — 76705 ECHO EXAM OF ABDOMEN: CPT | Performed by: RADIOLOGY

## 2024-10-05 PROBLEM — N95.0 POSTMENOPAUSAL BLEEDING: Status: ACTIVE | Noted: 2024-10-05

## 2024-10-05 PROBLEM — R93.89 ENDOMETRIAL THICKENING ON ULTRASOUND: Status: ACTIVE | Noted: 2024-10-05

## 2024-10-05 NOTE — PROGRESS NOTES
"Patient ID: Dayanara Rosales \"Shaniqua" is a 59 y.o. female.    Endometrial biopsy    Date/Time: 10/10/2024 7:52 AM    Performed by: Sheridan Odonnell MD  Authorized by: Sheridan Odonnell MD    Consent:     Consent obtained: written    Consent given by: patient    Risks discussed: bleeding, infection and pain    Alternatives discussed: observation and alternative treatment    Patient agrees, verbalizes understanding, and wants to proceed: yes    Indications:     Indications: postmenopausal bleeding and thickened endometrium      Chronicity of post-menopausal bleeding: new    Progression of post-menopausal bleeding: worsening  Pre-procedure:     Urine pregnancy test: N/A    Procedure:     A bimanual exam was performed: yes      Uterus size: 6-8 weeks    Uterus position: midposition    Prepped with: Betadine    Tenaculum used: yes      A local block was performed: no      Local anesthetic: none    Cervix dilated: no      Number of passes: 2  Findings:     Uterus depth by sound (cm): 8    Specimen collected: specimen collected and sent to pathology      Patient tolerance: tolerated well, no immediate complications  Subjective   Patient ID: Dayanara Rosales \"Shaniqua" is a 59 y.o. female who presents for Biopsy (States Bleeding has increased. ).  She presents for gyn visit today. Pap and HPV returned negative at last gyn visit with Mary Zuniga on 6/16/2022. Past medical history is significant for prediabetes, hyperlipidemia, hiatal hernia, sleep apnea and hypertension.  On 8/19/2024 she noted light vaginal bleeding. This is light in amount and is intermittent and associated with cramping and pressure sensation in the vagina as if she has prolapse. The bleeding is now heavier and red in color. She wears a pantyliner for this. There is no heavy bleeding however. She was evaluated for this by Desi Morris CNP on 9/13/2024 with negative pelvic exam. Gram stain and urine culture that date returned without sign of " infection, and urine dip was positive for trace blood. She also has had complaint of mid back pain.    Pelvic ultrasound was performed on 9/19/2024:  UTERUS:  The uterus measures 8.7 x 3.9 x 4.2 cm. No uterine masses.  ENDOMETRIUM:  The endometrium measures 1.9 cm. No focal abnormality.  RIGHT OVARY:  Obscured by overlying bowel gas.  LEFT OVARY:  Obscured by overlying bowel gas.    We reviewed the recommendation for endometrial biopsy to evaluate thickened endometrium on ultrasound and recent postmenopausal bleeding. She agrees to proceed. We also reviewed potential follow up which includes medication, dilation and curettage and possible hysterectomy.           Review of Systems   Constitutional:  Negative for activity change.   HENT:  Negative for congestion.    Respiratory:  Negative for apnea and cough.    Cardiovascular:  Negative for chest pain.   Gastrointestinal:  Negative for constipation and diarrhea.   Genitourinary:  Negative for hematuria and vaginal pain.   Musculoskeletal:  Negative for joint swelling.   Neurological:  Negative for dizziness.   Psychiatric/Behavioral:  Negative for agitation.        Past Medical History:   Diagnosis Date    Depression, unspecified 04/01/2013    Depression    Disorder of both eustachian tubes 07/24/2023    Dorsopathy, unspecified     Back problem    Essential (primary) hypertension 04/01/2013    Benign essential hypertension    Gastro-esophageal reflux disease without esophagitis 04/01/2013    Esophageal reflux    Headache, unspecified     Generalized headaches    Heartburn 04/01/2013    Heartburn    Palpitations     Palpitations    Panic disorder (episodic paroxysmal anxiety) 04/01/2013    Panic disorder without agoraphobia    Personal history of other diseases of the nervous system and sense organs     History of sleep apnea    Personal history of other diseases of the respiratory system 01/20/2015    History of sinusitis    Strain of muscle, fascia and tendon at  neck level, initial encounter 12/17/2014    Cervical strain, acute    Vitamin D deficiency, unspecified 04/01/2013    Vitamin D deficiency      Past Surgical History:   Procedure Laterality Date    CHOLECYSTECTOMY      GALLBLADDER SURGERY  02/25/2014    Gallbladder Surgery    TONSILLECTOMY  02/25/2014    Tonsillectomy With Adenoidectomy      No Known Allergies   Current Outpatient Medications on File Prior to Visit   Medication Sig Dispense Refill    amLODIPine (Norvasc) 5 mg tablet Take 1 tablet (5 mg) by mouth once daily. 90 tablet 0    atenolol (Tenormin) 50 mg tablet Take 1 tablet (50 mg) by mouth once daily. 90 tablet 0    benazepril (Lotensin) 20 mg tablet Take 1 tablet (20 mg) by mouth once daily. 90 tablet 0    fexofenadine (Allegra) 60 mg tablet Take 1 tablet (60 mg) by mouth if needed.      fluticasone (Flonase) 50 mcg/actuation nasal spray Administer 2 sprays into affected nostril(s) if needed.      multivit-min/ferrous fumarate (MULTI VITAMIN ORAL) Take by mouth. GUMMIE MV      omeprazole (PriLOSEC) 20 mg DR capsule Take 1 capsule (20 mg) by mouth once daily. 90 capsule 0    sertraline (Zoloft) 50 mg tablet Take 1 tablet (50 mg) by mouth once daily. 90 tablet 3     No current facility-administered medications on file prior to visit.        Objective   Physical Exam  Constitutional:       Appearance: Normal appearance.   Cardiovascular:      Rate and Rhythm: Normal rate and regular rhythm.   Pulmonary:      Effort: Pulmonary effort is normal.      Breath sounds: Normal breath sounds.   Abdominal:      Palpations: Abdomen is soft. There is no mass.      Tenderness: There is no abdominal tenderness.      Hernia: No hernia is present.   Genitourinary:     General: Normal vulva.      Exam position: Lithotomy position.      Labia:         Right: No lesion.         Left: No lesion.       Urethra: No urethral lesion.      Vagina: Prolapsed vaginal walls present. No lesions.      Cervix: No lesion.      Uterus:  Normal. Not enlarged, not tender and no uterine prolapse.       Adnexa: Right adnexa normal and left adnexa normal.        Right: No mass or tenderness.          Left: No mass or tenderness.        Comments: Mild cystocele and rectocele.  Tiny endocervical polyp is visualized.  Texture of cervix is slightly firm but mobile.  Skin:     General: Skin is warm and dry.   Neurological:      Mental Status: She is alert.           Problem List Items Addressed This Visit       Postmenopausal bleeding - Primary    Overview     Light vaginal bleeding started 8/14/2024 and is intermittent.   Pelvic ultrasound 9/19/2024 shows normal sized uterus with 1.9 cm endometrial thickness.  Endometrial biopsy is performed on 10/10/2024.         Current Assessment & Plan     Endometrial biopsy is sent.   She is interested in course of provera to synchronize endometrium and hopefully help with current bleeding. Provera 10 mg daily for 10 days is prescribed.  Will review results and plan of care when available.          Relevant Medications    medroxyPROGESTERone (Provera) 10 mg tablet    Other Relevant Orders    Endometrial biopsy    Surgical Pathology Exam    Endometrial thickening on ultrasound    Overview     9/19/2024 ultrasound was performed due to postmenopausal bleeding. Endometrium measures 1.9 cm.          Relevant Medications    medroxyPROGESTERone (Provera) 10 mg tablet    Other Relevant Orders    Endometrial biopsy    Surgical Pathology Exam

## 2024-10-05 NOTE — ASSESSMENT & PLAN NOTE
Endometrial biopsy is sent.   She is interested in course of provera to synchronize endometrium and hopefully help with current bleeding. Provera 10 mg daily for 10 days is prescribed.  Will review results and plan of care when available.

## 2024-10-10 ENCOUNTER — APPOINTMENT (OUTPATIENT)
Dept: OBSTETRICS AND GYNECOLOGY | Facility: CLINIC | Age: 59
End: 2024-10-10
Payer: COMMERCIAL

## 2024-10-10 VITALS
HEIGHT: 65 IN | DIASTOLIC BLOOD PRESSURE: 74 MMHG | WEIGHT: 202 LBS | BODY MASS INDEX: 33.66 KG/M2 | SYSTOLIC BLOOD PRESSURE: 140 MMHG

## 2024-10-10 DIAGNOSIS — N95.0 POSTMENOPAUSAL BLEEDING: Primary | ICD-10-CM

## 2024-10-10 DIAGNOSIS — R93.89 ENDOMETRIAL THICKENING ON ULTRASOUND: ICD-10-CM

## 2024-10-10 PROCEDURE — 58100 BIOPSY OF UTERUS LINING: CPT | Performed by: OBSTETRICS & GYNECOLOGY

## 2024-10-10 PROCEDURE — 99214 OFFICE O/P EST MOD 30 MIN: CPT | Performed by: OBSTETRICS & GYNECOLOGY

## 2024-10-10 RX ORDER — MEDROXYPROGESTERONE ACETATE 10 MG/1
10 TABLET ORAL DAILY
Qty: 10 TABLET | Refills: 3 | Status: SHIPPED | OUTPATIENT
Start: 2024-10-10 | End: 2025-10-10

## 2024-10-10 ASSESSMENT — ENCOUNTER SYMPTOMS
COUGH: 0
APNEA: 0
JOINT SWELLING: 0
DIZZINESS: 0
DIARRHEA: 0
HEMATURIA: 0
ACTIVITY CHANGE: 0
CONSTIPATION: 0
AGITATION: 0

## 2024-10-10 NOTE — LETTER
"October 10, 2024     Desi Morris, APRN-CNP  [address]  [facility], [address]  [facility] [address]    Patient: Carrie Rosales   YOB: 1965   Date of Visit: 10/10/2024       Dear Dr. Desi Morris, APRN-CNP:    Thank you for referring Carrie Rosales to me for evaluation. Below are my notes for this consultation.  If you have questions, please do not hesitate to call me. I look forward to following your patient along with you.       Sincerely,     Sheridan Odonnell MD      CC: No Recipients  ______________________________________________________________________________________    Patient ID: Dayanara Rosales \"Shaniqua" is a 59 y.o. female.    Endometrial biopsy    Date/Time: 10/10/2024 7:52 AM    Performed by: Sheridan Odonnell MD  Authorized by: Sheridan Odonnell MD    Consent:     Consent obtained: written    Consent given by: patient    Risks discussed: bleeding, infection and pain    Alternatives discussed: observation and alternative treatment    Patient agrees, verbalizes understanding, and wants to proceed: yes    Indications:     Indications: postmenopausal bleeding and thickened endometrium      Chronicity of post-menopausal bleeding: new    Progression of post-menopausal bleeding: worsening  Pre-procedure:     Urine pregnancy test: N/A    Procedure:     A bimanual exam was performed: yes      Uterus size: 6-8 weeks    Uterus position: midposition    Prepped with: Betadine    Tenaculum used: yes      A local block was performed: no      Local anesthetic: none    Cervix dilated: no      Number of passes: 2  Findings:     Uterus depth by sound (cm): 8    Specimen collected: specimen collected and sent to pathology      Patient tolerance: tolerated well, no immediate complications  Subjective  Patient ID: Dayanara Rosales \"Shaniqua" is a 59 y.o. female who presents for Biopsy (States Bleeding has increased. ).  She presents for gyn visit today. Pap and HPV " returned negative at last gyn visit with Mary Zuniga on 6/16/2022. Past medical history is significant for prediabetes, hyperlipidemia, hiatal hernia, sleep apnea and hypertension.  On 8/19/2024 she noted light vaginal bleeding. This is light in amount and is intermittent and associated with cramping and pressure sensation in the vagina as if she has prolapse. The bleeding is now heavier and red in color. She wears a pantyliner for this. There is no heavy bleeding however. She was evaluated for this by Desi Morris CNP on 9/13/2024 with negative pelvic exam. Gram stain and urine culture that date returned without sign of infection, and urine dip was positive for trace blood. She also has had complaint of mid back pain.    Pelvic ultrasound was performed on 9/19/2024:  UTERUS:  The uterus measures 8.7 x 3.9 x 4.2 cm. No uterine masses.  ENDOMETRIUM:  The endometrium measures 1.9 cm. No focal abnormality.  RIGHT OVARY:  Obscured by overlying bowel gas.  LEFT OVARY:  Obscured by overlying bowel gas.    We reviewed the recommendation for endometrial biopsy to evaluate thickened endometrium on ultrasound and recent postmenopausal bleeding. She agrees to proceed. We also reviewed potential follow up which includes medication, dilation and curettage and possible hysterectomy.           Review of Systems   Constitutional:  Negative for activity change.   HENT:  Negative for congestion.    Respiratory:  Negative for apnea and cough.    Cardiovascular:  Negative for chest pain.   Gastrointestinal:  Negative for constipation and diarrhea.   Genitourinary:  Negative for hematuria and vaginal pain.   Musculoskeletal:  Negative for joint swelling.   Neurological:  Negative for dizziness.   Psychiatric/Behavioral:  Negative for agitation.        Past Medical History:   Diagnosis Date   • Depression, unspecified 04/01/2013    Depression   • Disorder of both eustachian tubes 07/24/2023   • Dorsopathy, unspecified     Back problem    • Essential (primary) hypertension 04/01/2013    Benign essential hypertension   • Gastro-esophageal reflux disease without esophagitis 04/01/2013    Esophageal reflux   • Headache, unspecified     Generalized headaches   • Heartburn 04/01/2013    Heartburn   • Palpitations     Palpitations   • Panic disorder (episodic paroxysmal anxiety) 04/01/2013    Panic disorder without agoraphobia   • Personal history of other diseases of the nervous system and sense organs     History of sleep apnea   • Personal history of other diseases of the respiratory system 01/20/2015    History of sinusitis   • Strain of muscle, fascia and tendon at neck level, initial encounter 12/17/2014    Cervical strain, acute   • Vitamin D deficiency, unspecified 04/01/2013    Vitamin D deficiency      Past Surgical History:   Procedure Laterality Date   • CHOLECYSTECTOMY     • GALLBLADDER SURGERY  02/25/2014    Gallbladder Surgery   • TONSILLECTOMY  02/25/2014    Tonsillectomy With Adenoidectomy      No Known Allergies   Current Outpatient Medications on File Prior to Visit   Medication Sig Dispense Refill   • amLODIPine (Norvasc) 5 mg tablet Take 1 tablet (5 mg) by mouth once daily. 90 tablet 0   • atenolol (Tenormin) 50 mg tablet Take 1 tablet (50 mg) by mouth once daily. 90 tablet 0   • benazepril (Lotensin) 20 mg tablet Take 1 tablet (20 mg) by mouth once daily. 90 tablet 0   • fexofenadine (Allegra) 60 mg tablet Take 1 tablet (60 mg) by mouth if needed.     • fluticasone (Flonase) 50 mcg/actuation nasal spray Administer 2 sprays into affected nostril(s) if needed.     • multivit-min/ferrous fumarate (MULTI VITAMIN ORAL) Take by mouth. GUMMIE MV     • omeprazole (PriLOSEC) 20 mg DR capsule Take 1 capsule (20 mg) by mouth once daily. 90 capsule 0   • sertraline (Zoloft) 50 mg tablet Take 1 tablet (50 mg) by mouth once daily. 90 tablet 3     No current facility-administered medications on file prior to visit.        Objective  Physical  Exam  Constitutional:       Appearance: Normal appearance.   Cardiovascular:      Rate and Rhythm: Normal rate and regular rhythm.   Pulmonary:      Effort: Pulmonary effort is normal.      Breath sounds: Normal breath sounds.   Abdominal:      Palpations: Abdomen is soft. There is no mass.      Tenderness: There is no abdominal tenderness.      Hernia: No hernia is present.   Genitourinary:     General: Normal vulva.      Exam position: Lithotomy position.      Labia:         Right: No lesion.         Left: No lesion.       Urethra: No urethral lesion.      Vagina: Prolapsed vaginal walls present. No lesions.      Cervix: No lesion.      Uterus: Normal. Not enlarged, not tender and no uterine prolapse.       Adnexa: Right adnexa normal and left adnexa normal.        Right: No mass or tenderness.          Left: No mass or tenderness.        Comments: Mild cystocele and rectocele.  Tiny endocervical polyp is visualized.  Texture of cervix is slightly firm but mobile.  Skin:     General: Skin is warm and dry.   Neurological:      Mental Status: She is alert.           Problem List Items Addressed This Visit       Postmenopausal bleeding - Primary    Overview     Light vaginal bleeding started 8/14/2024 and is intermittent.   Pelvic ultrasound 9/19/2024 shows normal sized uterus with 1.9 cm endometrial thickness.  Endometrial biopsy is performed on 10/10/2024.         Current Assessment & Plan     Endometrial biopsy is sent.   She is interested in course of provera to synchronize endometrium and hopefully help with current bleeding. Provera 10 mg daily for 10 days is prescribed.  Will review results and plan of care when available.          Relevant Medications    medroxyPROGESTERone (Provera) 10 mg tablet    Other Relevant Orders    Endometrial biopsy    Surgical Pathology Exam    Endometrial thickening on ultrasound    Overview     9/19/2024 ultrasound was performed due to postmenopausal bleeding. Endometrium  measures 1.9 cm.          Relevant Medications    medroxyPROGESTERone (Provera) 10 mg tablet    Other Relevant Orders    Endometrial biopsy    Surgical Pathology Exam

## 2024-10-17 ENCOUNTER — TELEPHONE (OUTPATIENT)
Dept: OBSTETRICS AND GYNECOLOGY | Facility: CLINIC | Age: 59
End: 2024-10-17
Payer: COMMERCIAL

## 2024-10-17 DIAGNOSIS — C54.1 ENDOMETRIAL ADENOCARCINOMA (MULTI): Primary | ICD-10-CM

## 2024-10-17 LAB
LAB AP ASR DISCLAIMER: NORMAL
LABORATORY COMMENT REPORT: NORMAL
PATH REPORT.FINAL DX SPEC: NORMAL
PATH REPORT.GROSS SPEC: NORMAL
PATH REPORT.RELEVANT HX SPEC: NORMAL
PATH REPORT.TOTAL CANCER: NORMAL

## 2024-10-17 NOTE — TELEPHONE ENCOUNTER
Patient would like phone call after 4:00 please.  Gyn onc appt scheduled with Dr. Shantel Motley November 7th at 11:20, [place].

## 2024-10-17 NOTE — TELEPHONE ENCOUNTER
----- Message from Sheridan Odonnell sent at 10/17/2024  1:41 PM EDT -----  Regarding: follow up visit to review pathology  Pathologist reached out to report endometrial biopsy shows endometrial cancer. Can you see if Dayanara would like to come in today to review biopsy please?

## 2024-10-21 LAB
LAB AP ASR DISCLAIMER: NORMAL
LABORATORY COMMENT REPORT: NORMAL
PATH REPORT.ADDENDUM SPEC: NORMAL
PATH REPORT.ADDENDUM SPEC: NORMAL
PATH REPORT.FINAL DX SPEC: NORMAL
PATH REPORT.GROSS SPEC: NORMAL
PATH REPORT.RELEVANT HX SPEC: NORMAL
PATH REPORT.TOTAL CANCER: NORMAL

## 2024-10-24 ENCOUNTER — OFFICE VISIT (OUTPATIENT)
Dept: GYNECOLOGIC ONCOLOGY | Facility: HOSPITAL | Age: 59
End: 2024-10-24
Payer: COMMERCIAL

## 2024-10-24 ENCOUNTER — PREP FOR PROCEDURE (OUTPATIENT)
Dept: OPERATING ROOM | Facility: HOSPITAL | Age: 59
End: 2024-10-24

## 2024-10-24 VITALS
HEIGHT: 63 IN | RESPIRATION RATE: 20 BRPM | DIASTOLIC BLOOD PRESSURE: 86 MMHG | BODY MASS INDEX: 35.55 KG/M2 | TEMPERATURE: 97.7 F | SYSTOLIC BLOOD PRESSURE: 142 MMHG | HEART RATE: 63 BPM | OXYGEN SATURATION: 96 % | WEIGHT: 200.62 LBS

## 2024-10-24 DIAGNOSIS — C54.1 ENDOMETRIAL ADENOCARCINOMA (MULTI): Primary | ICD-10-CM

## 2024-10-24 PROCEDURE — 99215 OFFICE O/P EST HI 40 MIN: CPT | Performed by: STUDENT IN AN ORGANIZED HEALTH CARE EDUCATION/TRAINING PROGRAM

## 2024-10-24 RX ORDER — CELECOXIB 200 MG/1
400 CAPSULE ORAL ONCE
OUTPATIENT
Start: 2024-10-24 | End: 2024-10-24

## 2024-10-24 RX ORDER — GABAPENTIN 600 MG/1
600 TABLET ORAL ONCE
OUTPATIENT
Start: 2024-10-24 | End: 2024-10-24

## 2024-10-24 RX ORDER — HEPARIN SODIUM 5000 [USP'U]/ML
5000 INJECTION, SOLUTION INTRAVENOUS; SUBCUTANEOUS ONCE
OUTPATIENT
Start: 2024-10-24 | End: 2024-10-24

## 2024-10-24 RX ORDER — ACETAMINOPHEN 325 MG/1
975 TABLET ORAL ONCE
OUTPATIENT
Start: 2024-10-24 | End: 2024-10-24

## 2024-10-24 ASSESSMENT — PAIN SCALES - GENERAL: PAINLEVEL_OUTOF10: 0-NO PAIN

## 2024-10-24 NOTE — PROGRESS NOTES
"  Gynecologic Oncology Initial Consultation  [facility]    Patient ID: Dayanara Rosales \"Carrie\", 59 y.o.  Referring Physician: Sheridan Odonnell MD  Primary Care Provider: HAILEE Pena-CNP      Reason for Consultation: endometrial cancer  Subjective    HPI     59 y.o. here in consultation for endometrial cancer. Report of PMB with onset in August 2024. Light bleeding mostly, but became a little heavier, so she was seen by her PCP. Work up with Pelvic US revealed a 1.9 cm endometrial thickness. Referred to GYN Dr. Odonnell who ultimately performed an endometrial biopsy. Result returned Endometrial adenocarcinoma, endometrioid type, FIGO grade 1 MMRp p53wt.    Denies fevers/chills, N/V, CP, SOB, abdominal pain, dysuria, hematuria, constipation, diarrhea.    A comprehensive review of systems was performed and otherwise negative.    Objective      Past Medical History:   Diagnosis Date    Anxiety     Depression, unspecified     Dorsopathy, unspecified     Back problem    Essential (primary) hypertension     Gastro-esophageal reflux disease without esophagitis     Hiatal hernia    Headache, unspecified     IBS (irritable bowel syndrome)     RJ (obstructive sleep apnea)     Palpitations     Panic disorder (episodic paroxysmal anxiety)     Pelvic kidney     Vitamin D deficiency, unspecified        Past Surgical History:   Procedure Laterality Date    CHOLECYSTECTOMY      Laparoscopic    TONSILLECTOMY          Family History   Problem Relation Name Age of Onset    Hyperlipidemia Mother R Román     Hypertension Mother R Román     Macular degeneration Mother R Román     Breast cancer Mother R Román 51    Pancreatic cancer Mother R Román 91    Vision loss Mother R Román     Hyperlipidemia Father M Román     Hypertension Father M Román     Skin cancer Father M Román     Other (Chronic lymphocytic leukemia) Father M Román 80 - 89    Pancreatic cancer Brother J Román 64   Otherwise, " "denies history of endometrial, ovarian, breast, prostate, or colorectal cancers.    OBGYN Hx:  - ;  x2  - Menarche at 13; Menopause at 54 (2019); denies HRT use  - Has previously used OCPs for <1 years  - Last Pap: 2022 NILM, HRHPV    Screening:  - Last Mammogram: 2024 BI-RADS 1  - Last Colonoscopy: 2019 normal    Social Hx:  Dayanara Rosales  reports that she has never smoked. She has never been exposed to tobacco smoke. She has never used smokeless tobacco.  She  reports current alcohol use of about 1.0 - 2.0 standard drink of alcohol per week.  She  reports no history of drug use.  Lives at home with , Beau. Works as teacher, 2nd grade.       Physical Exam  BSA: 2.01 meters squared  /86   Pulse 63   Temp 36.5 °C (97.7 °F) (Temporal)   Resp 20   Ht 1.592 m (5' 2.68\")   Wt 91 kg (200 lb 9.9 oz)   SpO2 96%   BMI 35.91 kg/m²   General:   alert and oriented, in no acute distress   Heart: regular rate and rhythm, S1, S2 normal, no murmur, click, rub or gallop   Lungs: clear to auscultation bilaterally   Abdomen: soft, non-tender, without masses or organomegaly   Vulva: normal   Vagina: normal mucosa   Cervix: anteverted, no lesions, and small amount of old blood in vault   Uterus: normal size, anteverted   Adnexa: normal adnexa and no mass, fullness, tenderness   Rectal: deferred   Lymph Nodes:  Cervical, supraclavicular, and axillary nodes normal.   Extremities: warm, well-perfused without cyanosis, clubbing or edema   Skin: Normal       Pathology:  FINAL DIAGNOSIS   A. Endometrium, biopsy:   -Endometrial adenocarcinoma, endometrioid type, FIGO grade 1.       Imaging:  IMPRESSION:  Abnormally thickened endometrium. Tissue sampling is recommended to  assess for endometrial hyperplasia versus carcinoma. Yellow Alert.      Suspected hepatic steatosis. Possible right-sided hydronephrosis  versus parapelvic cysts. Recommend further assessment with right  upper quadrant " ultrasound.    Performance Status:  Asymptomatic      Assessment/Plan    59 y.o. with FIGO grade 1 endometrioid endometrial adenocarcinoma, MMRp, p53WT.   Comorbidities: HTN,     Oncology History Overview Note   9/19/24 Pelvic US uterus 8.7 x 3.9 x 4.2 cm  EML 1.9 cm  10/10/24 EMBx Endometrial adenocarcinoma, endometrioid type, FIGO grade 1 MMRp p53wt         # Endometrial cancer  - Discussed the significance of histologic subtype, grade and stage  - Discussed management options including medical, non-surgical, and surgical options.   - Discussed recommendation for surgery with a hysterectomy, BSO, sentinel lymph node mapping and biopsy with possible need for unilateral or bilateral lymphadenectomy. We discussed my recommendation for a minimally invasive approach  - Discussed surgical risks, anticipated hospital stay, and recovery   - Plan for total laparoscopic hysterectomy, bilateral salpingo-oophorectomy, sentinel lymph node mapping and biopsy, any other indicated procedure(s)  - She will need PAT  - Not a candidate for same day discharge    # Extensive Familial Cancer History  - Referral to Genetics      Treatment Plans       No treatment plans exist            Seen and discussed with Dr. Motley.    Carolin Viera MD  Gynecologic Oncology Fellow    I saw and evaluated the patient. I personally obtained the key and critical portions of the history and physical exam or was physically present for key and critical portions performed by the resident/fellow. I reviewed the resident/fellow's documentation and discussed the patient with the resident/fellow. I agree with the resident/fellow's medical decision making as documented in the note.    Shantel Motley MD MPH

## 2024-10-31 ENCOUNTER — APPOINTMENT (OUTPATIENT)
Dept: GYNECOLOGIC ONCOLOGY | Facility: HOSPITAL | Age: 59
End: 2024-10-31
Payer: COMMERCIAL

## 2024-11-07 ENCOUNTER — APPOINTMENT (OUTPATIENT)
Dept: GYNECOLOGIC ONCOLOGY | Facility: HOSPITAL | Age: 59
End: 2024-11-07
Payer: COMMERCIAL

## 2024-11-07 ENCOUNTER — TELEPHONE (OUTPATIENT)
Dept: GYNECOLOGIC ONCOLOGY | Facility: HOSPITAL | Age: 59
End: 2024-11-07
Payer: COMMERCIAL

## 2024-11-07 NOTE — TELEPHONE ENCOUNTER
The patient's FMLA forms were faxed to [employer] at  per the patient's request. The patient was notified via phone that the forms were faxed.

## 2024-11-14 ENCOUNTER — CLINICAL SUPPORT (OUTPATIENT)
Dept: PREADMISSION TESTING | Facility: HOSPITAL | Age: 59
End: 2024-11-14
Payer: COMMERCIAL

## 2024-11-14 ASSESSMENT — ENCOUNTER SYMPTOMS
VISUAL CHANGE: 1
CONSTITUTIONAL NEGATIVE: 1
ARTHRALGIAS: 1
NEUROLOGICAL NEGATIVE: 1
RESPIRATORY NEGATIVE: 1
ENDOCRINE NEGATIVE: 1
NECK NEGATIVE: 1
RHINORRHEA: 1
CARDIOVASCULAR NEGATIVE: 1
GASTROINTESTINAL NEGATIVE: 1

## 2024-11-14 ASSESSMENT — DUKE ACTIVITY SCORE INDEX (DASI)
DASI METS SCORE: 9.9
CAN YOU CLIMB A FLIGHT OF STAIRS OR WALK UP A HILL: YES
CAN YOU DO HEAVY WORK AROUND THE HOUSE LIKE SCRUBBING FLOORS OR LIFTING AND MOVING HEAVY FURNITURE: YES
CAN YOU HAVE SEXUAL RELATIONS: YES
CAN YOU DO MODERATE WORK AROUND THE HOUSE LIKE VACUUMING, SWEEPING FLOORS OR CARRYING GROCERIES: YES
CAN YOU PARTICIPATE IN STRENOUS SPORTS LIKE SWIMMING, SINGLES TENNIS, FOOTBALL, BASKETBALL, OR SKIING: YES
CAN YOU RUN A SHORT DISTANCE: YES
CAN YOU WALK A BLOCK OR TWO ON LEVEL GROUND: YES
CAN YOU DO LIGHT WORK AROUND THE HOUSE LIKE DUSTING OR WASHING DISHES: YES
CAN YOU TAKE CARE OF YOURSELF (EAT, DRESS, BATHE, OR USE TOILET): YES
TOTAL_SCORE: 58.2
CAN YOU WALK INDOORS, SUCH AS AROUND YOUR HOUSE: YES
CAN YOU PARTICIPATE IN MODERATE RECREATIONAL ACTIVITIES LIKE GOLF, BOWLING, DANCING, DOUBLES TENNIS OR THROWING A BASEBALL OR FOOTBALL: YES
CAN YOU DO YARD WORK LIKE RAKING LEAVES, WEEDING OR PUSHING A MOWER: YES

## 2024-11-14 NOTE — CPM/PAT H&P
"CPM/PAT Evaluation       Name: Dayanara Rosales (Dayanara Rosales \"Carrie\")  /Age: 1965/59 y.o.     { PAT Visit Type:27963}      Chief Complaint: ***    HPI    Dayanara Rosales is scheduled for Hysterectomy Laparoscopy - Bilateral on 24    Past Medical History:   Diagnosis Date    Depression, unspecified     Dorsopathy, unspecified     Back problem    Endometrial cancer (Multi)     seen by Dr. Shantel Motley on 10/24/24    Essential (primary) hypertension     Gastro-esophageal reflux disease without esophagitis     Headache, unspecified     Hepatic steatosis     Noted 2024 on US Pelvis--Suspected hepatic steatosis    Hiatal hernia     IBS (irritable bowel syndrome)     MORGAN (nonalcoholic steatohepatitis)     RJ (obstructive sleep apnea)     No CPAP    Palpitations     Panic disorder (episodic paroxysmal anxiety)     Pelvic kidney     Postmenopausal bleeding     Vision loss     Wears corrective lens    Vitamin D deficiency, unspecified        Past Surgical History:   Procedure Laterality Date    CHOLECYSTECTOMY      Laparoscopic    COLONOSCOPY      ENDOMETRIAL BIOPSY      TONSILLECTOMY         Patient  reports that she is not currently sexually active and has had partner(s) who are male. She reports using the following method of birth control/protection: Post-menopausal.    Family History   Problem Relation Name Age of Onset    Hyperlipidemia Mother R Román     Hypertension Mother R Román     Macular degeneration Mother R Román     Breast cancer Mother R Román 51    Pancreatic cancer Mother R Ormán 91    Vision loss Mother R Román     Hyperlipidemia Father M Román     Hypertension Father M Román     Skin cancer Father M Román     Other (Chronic lymphocytic leukemia) Father M Román 80 - 89    Pancreatic cancer Brother J Román 64       No Known Allergies    Prior to Admission medications    Medication Sig Start Date End Date Taking? Authorizing Provider "   amLODIPine (Norvasc) 5 mg tablet Take 1 tablet (5 mg) by mouth once daily. 9/13/24   PASCUAL Pena   atenolol (Tenormin) 50 mg tablet Take 1 tablet (50 mg) by mouth once daily. 9/9/24   PASCUAL Pena   benazepril (Lotensin) 20 mg tablet Take 1 tablet (20 mg) by mouth once daily. 9/13/24   PASCUAL Pena   fexofenadine (Allegra) 60 mg tablet Take 1 tablet (60 mg) by mouth if needed.    Historical Provider, MD   fluticasone (Flonase) 50 mcg/actuation nasal spray Administer 2 sprays into affected nostril(s) if needed. 10/25/21   Historical Provider, MD   medroxyPROGESTERone (Provera) 10 mg tablet Take 1 tablet (10 mg) by mouth once daily. Take 1 tablet by mouth daily for 10 days. 10/10/24 10/10/25  Sheridan Odonnell MD   multivit-min/ferrous fumarate (MULTI VITAMIN ORAL) Take by mouth. GUMMIE MV    Historical Provider, MD   omeprazole (PriLOSEC) 20 mg DR capsule Take 1 capsule (20 mg) by mouth once daily. 9/9/24   PASCUAL Pena   sertraline (Zoloft) 50 mg tablet Take 1 tablet (50 mg) by mouth once daily. 3/5/24   PASCUAL Pena        PAT ROS:   Constitutional:   neg    Neuro/Psych:   neg    Eyes:    vision loss   use of corrective lenses  Ears:   Nose:    nasal discharge  Mouth:   neg    Throat:   neg    Neck:   neg    Cardio:   neg    Respiratory:   neg    Endocrine:   neg    GI:   neg    :    vaginal issues  Musculoskeletal:    arthralgias  Hematologic:   neg    Skin:      PAT Physical Exam     Airway        Testing/Diagnostic:         - MR Brain: 12/01/17  IMPRESSION:  Minimal nonspecific bihemispheric white matter signal changes     Otherwise unremarkable examination.      - US Pelvis transabdominal: 09/19/24  IMPRESSION:  Abnormally thickened endometrium. Tissue sampling is recommended to  assess for endometrial hyperplasia versus carcinoma. Yellow Alert.      Suspected hepatic steatosis. Possible right-sided hydronephrosis  versus  parapelvic cysts. Recommend further assessment with right  upper quadrant ultrasound.      - METS: 9.9    - A1C: 5.9 on 09/17/24        Patient Specialist/PCP:           PCP: Desi Morris CNP 09/13/24 presents for Back Pain, Abdominal Pain (Pt has pain suprapubic area and recently started spotting blood.         Gyn Onc: Shantel Tolu 10/24/24 Consultation for endometrial cancer. Work up with Pelvic US revealed a 1.9 cm endometrial thickness. Referred to GYN Dr. Odonnell who ultimately performed an endometrial biopsy. Result returned Endometrial adenocarcinoma, endometrioid type, FIGO grade 1 MMRp p53wt.    - Plan for total laparoscopic hysterectomy, bilateral salpingo-oophorectomy, sentinel lymph node mapping and biopsy, any other indicated procedure(s)  - She will need PAT  - Not a candidate for same day discharge          _______________________________________________________________________  Medication instructions:   Instructed to hold Vitamins, Supplements and Ibuprofen 7 days prior to surgery            Marian Coughlin LPN  Preadmission Testing              There were no vitals taken for this visit.    DASI Risk Score      Flowsheet Row Clinical Support from 11/14/2024 in [facility]   Can you take care of yourself (eat, dress, bathe, or use toilet)?  2.75 filed at 11/14/2024 1338   Can you walk indoors, such as around your house? 1.75 filed at 11/14/2024 1338   Can you walk a block or two on level ground?  2.75 filed at 11/14/2024 1338   Can you climb a flight of stairs or walk up a hill? 5.5 filed at 11/14/2024 1338   Can you run a short distance? 8 filed at 11/14/2024 1338   Can you do light work around the house like dusting or washing dishes? 2.7 filed at 11/14/2024 1338   Can you do moderate work around the house like vacuuming, sweeping floors or carrying groceries? 3.5 filed at 11/14/2024 2949   Can you do heavy work around the house like scrubbing floors or lifting and moving heavy  furniture?  8 filed at 11/14/2024 1338   Can you do yard work like raking leaves, weeding or pushing a mower? 4.5 filed at 11/14/2024 1338   Can you have sexual relations? 5.25 filed at 11/14/2024 1338   Can you participate in moderate recreational activities like golf, bowling, dancing, doubles tennis or throwing a baseball or football? 6 filed at 11/14/2024 1338   Can you participate in strenous sports like swimming, singles tennis, football, basketball, or skiing? 7.5 filed at 11/14/2024 1338   DASI SCORE 58.2 filed at 11/14/2024 1338   METS Score (Will be calculated only when all the questions are answered) 9.9 filed at 11/14/2024 1338          Caprini DVT Assessment    No data to display       Modified Frailty Index    No data to display       CHADS2 Stroke Risk  Current as of 2 hours ago        N/A 3 to 100%: High Risk   2 to < 3%: Medium Risk   0 to < 2%: Low Risk     Last Change: N/A          This score determines the patient's risk of having a stroke if the patient has atrial fibrillation.        This score is not applicable to this patient. Components are not calculated.          Revised Cardiac Risk Index    No data to display       Apfel Simplified Score    No data to display       Risk Analysis Index Results This Encounter    No data found in the last 10 encounters.       Prodigy: High Risk  Total Score: 0          ARISCAT Score for Postoperative Pulmonary Complications    No data to display       Mata Perioperative Risk for Myocardial Infarction or Cardiac Arrest (DIMITRY)    No data to display         Assessment and Plan:     {[facility] ASSESSMENT AND PLAN:42514}

## 2024-11-14 NOTE — SIGNIFICANT EVENT
11/14/24 1338   DASI Activity Score Index   Can you take care of yourself (eat, dress, bathe, or use toilet)?  2.75   Can you walk indoors, such as around your house? 1.75   Can you walk a block or two on level ground?  2.75   Can you climb a flight of stairs or walk up a hill? 5.5   Can you run a short distance? 8   Can you do light work around the house like dusting or washing dishes? 2.7   Can you do moderate work around the house like vacuuming, sweeping floors or carrying groceries? 3.5   Can you do heavy work around the house like scrubbing floors or lifting and moving heavy furniture?  8   Can you do yard work like raking leaves, weeding or pushing a mower? 4.5   Can you have sexual relations? 5.25   Can you participate in moderate recreational activities like golf, bowling, dancing, doubles tennis or throwing a baseball or football? 6   Can you participate in strenous sports like swimming, singles tennis, football, basketball, or skiing? 7.5   DASI SCORE 58.2   METS Score (Will be calculated only when all the questions are answered) 9.9

## 2024-11-21 ENCOUNTER — TELEMEDICINE CLINICAL SUPPORT (OUTPATIENT)
Dept: PREADMISSION TESTING | Facility: HOSPITAL | Age: 59
End: 2024-11-21
Payer: COMMERCIAL

## 2024-11-21 DIAGNOSIS — C54.1 ENDOMETRIAL ADENOCARCINOMA (MULTI): ICD-10-CM

## 2024-11-21 PROCEDURE — 99443 PR PHYS/QHP TELEPHONE EVALUATION 21-30 MIN: CPT | Performed by: NURSE PRACTITIONER

## 2024-11-21 ASSESSMENT — ENCOUNTER SYMPTOMS
ENDOCRINE NEGATIVE: 1
CARDIOVASCULAR NEGATIVE: 1
MUSCULOSKELETAL NEGATIVE: 1
RESPIRATORY NEGATIVE: 1
DIARRHEA: 1
CONSTITUTIONAL NEGATIVE: 1
NECK NEGATIVE: 1
NEUROLOGICAL NEGATIVE: 1
EYES NEGATIVE: 1

## 2024-11-21 ASSESSMENT — LIFESTYLE VARIABLES: SMOKING_STATUS: NONSMOKER

## 2024-11-21 NOTE — CPM/PAT H&P
"CPM/PAT Evaluation       Name: Dayanara Rosales (Dayanara Rosales \"Carrie\")  /Age: 1965/59 y.o.     Visit Type:   Virtual Visit      Virtual or Telephone Consent    An interactive audio and video telecommunication system which permits real time communications between the patient (at the originating site) and provider (at the distant site) was utilized to provide this telehealth service.   Verbal consent was requested and obtained from Dayanara Rosales on this date, 24 for a telehealth visit.      Chief Complaint: perioperative evaluation      HPI  The patient is a 59 year old  female with newly diagnosed endometrial cancer who presents for perioperative evaluation in anticipation of Hysterectomy Laparoscopy - Bilateral on 24 with Dr. Motley.    Past Medical History:   Diagnosis Date    Arthritis     Depression, unspecified     Dorsopathy, unspecified     Back problem    Endometrial cancer (Multi)     seen by Dr. Shantel Motley on 10/24/24    Essential (primary) hypertension     Gastro-esophageal reflux disease without esophagitis     Headache, unspecified     Hepatic steatosis     Noted 2024 on US Pelvis--Suspected hepatic steatosis    Hiatal hernia     IBS (irritable bowel syndrome)     MORGAN (nonalcoholic steatohepatitis)     RJ (obstructive sleep apnea)     No CPAP    Palpitations     Panic disorder (episodic paroxysmal anxiety)     Pelvic kidney     Postmenopausal bleeding     Vision loss     Wears corrective lens    Vitamin D deficiency, unspecified        Past Surgical History:   Procedure Laterality Date    CHOLECYSTECTOMY      Laparoscopic    COLONOSCOPY      ENDOMETRIAL BIOPSY      TONSILLECTOMY  2014       Patient  reports that she is not currently sexually active and has had partner(s) who are male. She reports using the following method of birth control/protection: Post-menopausal.    Family History   Problem Relation Name Age of Onset    Hyperlipidemia Mother " R Román     Hypertension Mother R Román     Macular degeneration Mother R Román     Breast cancer Mother R Román 51    Pancreatic cancer Mother R Román 91    Vision loss Mother R Román     Cancer Mother R Román     Hyperlipidemia Father M Román     Hypertension Father M Román     Skin cancer Father M Román     Other (Chronic lymphocytic leukemia) Father M Román 80 - 89    Pancreatic cancer Brother J Román 64    Cancer Brother J Román        No Known Allergies    Prior to Admission medications    Medication Sig Start Date End Date Taking? Authorizing Provider   amLODIPine (Norvasc) 5 mg tablet Take 1 tablet (5 mg) by mouth once daily. 9/13/24   PASCUAL Pena   atenolol (Tenormin) 50 mg tablet Take 1 tablet (50 mg) by mouth once daily. 9/9/24   PASCUAL Pena   benazepril (Lotensin) 20 mg tablet Take 1 tablet (20 mg) by mouth once daily. 9/13/24   PASCUAL Pena   fexofenadine (Allegra) 60 mg tablet Take 1 tablet (60 mg) by mouth if needed.    Historical Provider, MD   fluticasone (Flonase) 50 mcg/actuation nasal spray Administer 2 sprays into affected nostril(s) if needed.  Patient not taking: Reported on 11/14/2024 10/25/21   Historical Provider, MD   medroxyPROGESTERone (Provera) 10 mg tablet Take 1 tablet (10 mg) by mouth once daily. Take 1 tablet by mouth daily for 10 days. 10/10/24 10/10/25  Sheridan Odonnell MD   multivit-min/ferrous fumarate (MULTI VITAMIN ORAL) Take by mouth. GUMMIE MV    Historical Provider, MD   omeprazole (PriLOSEC) 20 mg DR capsule Take 1 capsule (20 mg) by mouth once daily. 9/9/24   PASCUAL Pena   sertraline (Zoloft) 50 mg tablet Take 1 tablet (50 mg) by mouth once daily. 3/5/24   PASCUAL Pena        PAT ROS:   Constitutional:   neg    Neuro/Psych:   neg    Eyes:   neg     use of corrective lenses  Ears:   neg    Nose:   neg    Mouth:   neg    Throat:   neg    Neck:   neg     Cardio:   neg    Respiratory:   neg    Endocrine:   neg    GI:    diarrhea (chronic, unchanged)  :    Vaginal spotting  neg    Musculoskeletal:   neg    Hematologic:   neg    Skin:  neg        PAT Physical Exam     PAT AIRWAY:   Airway:     Mallampati::  Unable to assess    Neck ROM::  Full      There were no vitals taken for this visit.    DASI Risk Score      Flowsheet Row Clinical Support from 11/14/2024 in [facility]   Can you take care of yourself (eat, dress, bathe, or use toilet)?  2.75 filed at 11/14/2024 1338   Can you walk indoors, such as around your house? 1.75 filed at 11/14/2024 1338   Can you walk a block or two on level ground?  2.75 filed at 11/14/2024 1338   Can you climb a flight of stairs or walk up a hill? 5.5 filed at 11/14/2024 1338   Can you run a short distance? 8 filed at 11/14/2024 1338   Can you do light work around the house like dusting or washing dishes? 2.7 filed at 11/14/2024 1338   Can you do moderate work around the house like vacuuming, sweeping floors or carrying groceries? 3.5 filed at 11/14/2024 1338   Can you do heavy work around the house like scrubbing floors or lifting and moving heavy furniture?  8 filed at 11/14/2024 1338   Can you do yard work like raking leaves, weeding or pushing a mower? 4.5 filed at 11/14/2024 1338   Can you have sexual relations? 5.25 filed at 11/14/2024 1338   Can you participate in moderate recreational activities like golf, bowling, dancing, doubles tennis or throwing a baseball or football? 6 filed at 11/14/2024 1338   Can you participate in strenous sports like swimming, singles tennis, football, basketball, or skiing? 7.5 filed at 11/14/2024 1338   DASI SCORE 58.2 filed at 11/14/2024 1338   METS Score (Will be calculated only when all the questions are answered) 9.9 filed at 11/14/2024 1338          Caprini DVT Assessment      Flowsheet Row Telemedicine Clinical Support from 11/21/2024 in [facility]   DVT  Score 8 filed at 11/21/2024 1525   Medical Factors Present cancer, chemotherapy, or previous malignancy filed at 11/21/2024 1525   Surgical Factors Major surgery planned, lasting 2-3 hours filed at 11/21/2024 1525   BMI 31-40 (Obesity) filed at 11/21/2024 1525          Modified Frailty Index      Flowsheet Row Telemedicine Clinical Support from 11/21/2024 in [facility]   Non-independent functional status (problems with dressing, bathing, personal grooming, or cooking) 0 filed at 11/21/2024 1526   History of diabetes mellitus  0 filed at 11/21/2024 1526   History of COPD 0 filed at 11/21/2024 1526   History of CHF No filed at 11/21/2024 1526   History of MI 0 filed at 11/21/2024 1526   History of Percutaneous Coronary Intervention, Cardiac Surgery, or Angina No filed at 11/21/2024 1526   Hypertension requiring the use of medication  0.0909 filed at 11/21/2024 1526   Peripheral vascular disease 0 filed at 11/21/2024 1526   Impaired sensorium (cognitive impairement or loss, clouding, or delirium) 0 filed at 11/21/2024 1526   TIA or CVA withouy residual deficit 0 filed at 11/21/2024 1526   Cerebrovascular accident with deficit 0 filed at 11/21/2024 1526   Modified Frailty Index Calculator .0909 filed at 11/21/2024 1526          CHADS2 Stroke Risk  Current as of 6 hours ago        N/A 3 to 100%: High Risk   2 to < 3%: Medium Risk   0 to < 2%: Low Risk     Last Change: N/A          This score determines the patient's risk of having a stroke if the patient has atrial fibrillation.        This score is not applicable to this patient. Components are not calculated.          Revised Cardiac Risk Index      Flowsheet Row Telemedicine Clinical Support from 11/21/2024 in [facility]   High-Risk Surgery (Intraperitoneal, Intrathoracic,Suprainguinal vascular) 0 filed at 11/21/2024 1526   History of ischemic heart disease (History of MI, History of positive exercuse test, Current chest paint  considered due to myocardial ischemia, Use of nitrate therapy, ECG with pathological Q Waves) 0 filed at 11/21/2024 1526   History of congestive heart failure (pulmonary edemia, bilateral rales or S3 gallop, Paroxysmal nocturnal dyspnea, CXR showing pulmonary vascular redistribution) 0 filed at 11/21/2024 1526   History of cerebrovascular disease (Prior TIA or stroke) 0 filed at 11/21/2024 1526   Pre-operative insulin treatment 0 filed at 11/21/2024 1526   Pre-operative creatinine>2 mg/dl 0 filed at 11/21/2024 1526   Revised Cardiac Risk Calculator 0 filed at 11/21/2024 1526          Apfel Simplified Score      Flowsheet Row Telemedicine Clinical Support from 11/21/2024 in [facility]   Smoking status 1 filed at 11/21/2024 1526   History of motion sickness or PONV  0 filed at 11/21/2024 1526   Use of postoperative opioids 1 filed at 11/21/2024 1526   Gender - Female 1=Yes filed at 11/21/2024 1526   Apfel Simplified Score Calculator 3 filed at 11/21/2024 1526          Risk Analysis Index Results This Encounter    No data found in the last 10 encounters.       Prodigy: High Risk  Total Score: 0          ARISCAT Score for Postoperative Pulmonary Complications    No data to display       Mata Perioperative Risk for Myocardial Infarction or Cardiac Arrest (DIMITRY)    No data to display       Recent Results (from the past 3 weeks)   Type And Screen    Collection Time: 12/16/24 10:38 AM   Result Value Ref Range    ABO TYPE O     Rh TYPE POS     ANTIBODY SCREEN NEG    CBC    Collection Time: 12/16/24 10:38 AM   Result Value Ref Range    WBC 8.9 4.4 - 11.3 x10*3/uL    nRBC 0.0 0.0 - 0.0 /100 WBCs    RBC 4.53 4.00 - 5.20 x10*6/uL    Hemoglobin 12.5 12.0 - 16.0 g/dL    Hematocrit 40.6 36.0 - 46.0 %    MCV 90 80 - 100 fL    MCH 27.6 26.0 - 34.0 pg    MCHC 30.8 (L) 32.0 - 36.0 g/dL    RDW 13.2 11.5 - 14.5 %    Platelets 287 150 - 450 x10*3/uL   Basic Metabolic Panel    Collection Time: 12/16/24 10:38 AM   Result  Value Ref Range    Glucose 88 74 - 99 mg/dL    Sodium 143 136 - 145 mmol/L    Potassium 4.0 3.5 - 5.3 mmol/L    Chloride 107 98 - 107 mmol/L    Bicarbonate 27 21 - 32 mmol/L    Anion Gap 13 10 - 20 mmol/L    Urea Nitrogen 11 6 - 23 mg/dL    Creatinine 0.72 0.50 - 1.05 mg/dL    eGFR >90 >60 mL/min/1.73m*2    Calcium 9.2 8.6 - 10.3 mg/dL      Diagnostic Results    EKG  Sinus bradycardia, rate 55.   Moderate voltage criteria for LVH.   Nonspecific t wave abnormality     Assessment and Plan:     Anesthesia  The patient denies problems with anesthesia in the past such as PONV, prolonged sedation, awareness, dental damage, aspiration, cardiac arrest, difficult intubation, or unexpected hospital admissions.     Cardiovascular  #HTN  -managed on amlodipine, atenolol and benazepril, continue  amlodipine and atenolol as prescribed in the perioperative period, instructed to hold benazepril 24 hours prior to surgery  She is scheduled for non-cardiac surgery associated with elevated risk. The patient has no major cardiac contraindications to non- cardiac surgery.  Cardiology Evaluation  The patient is not followed by cardiology.  METS  The patient's functional capacity capacity is greater than 4 METS.  RCRI  The patient meets 0 RCRI criteria and therefor has a 3.9% risk of major adverse cardiac complications.  DIMITRY score which indicates a 0.1% risk of intraoperative or 30-day postoperative MACE (major adverse cardiac event).    Pulmonary  #RJ  -non-compliant, needs new machine.  The patient is at increased risk of perioperative pulmonary complications secondary to RJ, morbid obesity.     Recommend prioritizing non opioid analgesic techniques (regional and local anesthesia, nonsteroidal medications, etc) before the administration of opioids and close monitoring for hypoventilation after surgery due to RJ/supsected RJ. If intravenous narcotics are needed beyond the immediate angelika-operative period, the patient may benefit from  continuous pulse oximetry to monitor for hypoxic events till baseline Sp02 is normal on room air and  a respiratory therapy evaluation.    ARISCAT 19, low, 1.6% risk of in-hospital postoperative pulmonary complications  PRODIGY 3, low risk of respiratory depression episode.     Patient given PI sheet for preoperative deep breathing exercises.  Encourage  incentive spirometry in the postoperative period as deemed necessary.    Neurology  The patient has anxiety treated with sertraline, instructed to continue as prescribed. No acute neuro changes. The patient is at increased risk for perioperative stroke secondary to hypertension , female gender, general anesthesia. Handouts for preoperative brain exercises given to patient.    HEENT/Airway  #PND  -managed with OTC allegra, flonase, continue as prescribed in the perioperative period  The patient is at increased risk of airway difficulty secondary to obesity, short thick neck. No documented or reported history of airway difficulty.     Endocrine  No diagnoses or significant findings on chart review or clinical presentation and evaluation.    Gastrointestinal  #GERD  -managed on omeprazole, continue as prescribed in the perioperative period. No acute GI complaints  #HH  -asymptomatic, does not require surgical repair at this time  #IBS  -ongoing diarrhea, unchanged. Not currently following with gastro.   #MORGAN  -FL noted on recent CT, CMP from 9/17/24 with normal LFTs.    Eat 10- 0,  self-perceived oropharyngeal dysphagia scale (0-40)     Genitourinary  #Endometrial cancer  -PMB since 8/2024, endometrial biopsy endometrioid type, FIGO grade 1 MMRp p53wt. Scheduled for hysterectomy with Dr. Motley on 12/31/24.    Renal  The patient has history of right pelvic kidney. Stable renal function. The patient has specific risk factors associated with increased risk of perioperative renal complications related to age greater than 55, hypertension. Preventative measures include  preoperative hydration.    Hematology  No diagnoses or significant findings on chart review or clinical presentation and evaluation.    Caprini score 8, high risk of perioperative VTE.     Patient instructed to ambulate as soon as possible postoperatively to decrease thromboembolic risk. Initiate mechanical DVT prophylaxis as soon as possible and initiate chemical prophylaxis when deemed safe from a bleeding standpoint post surgery.     Transfusion Evaluation  A type and screen was ordered patient instructed not to have this drawn until within 21 days of surgery    Musculoskeletal  #OA  -intermittent low back pain, oral analgesics as needed. Instructed to avoid NSAIDs 7 days prior to surgery.     ID  No diagnoses or significant findings on chart review or clinical presentation and evaluation.    -Preoperative medication instructions were provided and reviewed with the patient.  Any additional testing or evaluation was explained to the patient.  NPO Instructions were discussed, and the patient's questions were answered prior to conclusion of this encounter. Patient verbalized understanding of preoperative instructions. After Visit Summary given.      Patient verbalized that she has access to  [employer]. She has orders placed by Dr. Motley. She was instructed to not have these drawn until she is within 21 days of surgery. She will also have EKG completed. She plans to go to [facility] 12/10 or later.

## 2024-11-21 NOTE — PREPROCEDURE INSTRUCTIONS
Fasting Guidelines    NPO Instructions:    Do not eat any food after midnight the night before your surgery/procedure.  You may have up to 13.5 ounces of clear liquids until TWO hours before your instructed arrival time to the hospital. This includes water, black tea/coffee, (no milk or cream), apple juice, and/or electrolyte drinks (Gatorade).  You may chew gum up to TWO hours before your surgery/procedure.    Additional Instructions:    Avoid herbal supplements, multivitamins and NSAIDS (non-steroidal anti-inflammatory drugs) such as Advil, Aleve, Ibuprofen, Naproxen, Excedrin, Meloxicam or Celebrex for at least 7 days prior to surgery. May take Tylenol as needed.    Avoid tobacco and alcohol products for 24 hours prior to surgery.    CONTACT SURGEON'S OFFICE IF YOU DEVELOP:  * Fever = 100.4 F   * New respiratory symptoms (e.g. cough, shortness of breath, respiratory distress, sore throat)  * Recent loss of taste or smell  *Flu like symptoms such as headache, fatigue or gastrointestinal symptoms  * You develop any open sores, shingles, burning or painful urination   AND/OR:  * You no longer wish to have the surgery.  * Any other personal circumstances change that may lead to the need to cancel or defer this surgery.  *You were admitted to any hospital within one week of your planned procedure.    Seven/Six Days before Surgery:  Review your medication instructions, stop indicated medications    Day of Surgery:  Review your medication instructions, take indicated medications  Wear comfortable loose fitting clothing  Do not use moisturizers, creams, lotions or perfume  All jewelry and valuables should be left at home    [facility]  [facility]  [phone]  [phone]  [email or web address]      Preoperative Brain Exercises    What are brain exercises?  A brain exercise is any activity that engages your thinking (cognitive) skills.    What types of  activities are considered brain exercises?  Jigsaw puzzles, crossword puzzles, word jumble, memory games, word search, and many more.  Many can be found free online or on your phone via a mobile malcolm.    Why should I do brain exercises before my surgery?  More recent research has shown brain exercise before surgery can lower the risk of postoperative delirium (confusion) which can be especially important for older adults.  Patients who did brain exercises for 5 to 10 hours the days before surgery, cut their risk of postoperative delirium in half up to 1 week after surgery.         [facility]    Preoperative Deep Breathing Exercises    Why it is important to do deep breathing exercises before my surgery?  Deep breathing exercises strengthen your breathing muscles.  This helps you to recover after your surgery and decreases the chance of breathing complications.      How are the deep breathing exercises done?  Sit straight with your back supported.  Breathe in deeply and slowly through your nose. Your lower rib cage should expand and your abdomen may move forward.  Hold that breath for 3 to 5 seconds.  Breathe out through pursed lips, slowly and completely.  Rest and repeat 10 times every hour while awake.  Rest longer if you become dizzy or lightheaded.         Patient and Family Education             Ways You Can Help Prevent Blood Clots             This handout explains some simple things you can do to help prevent blood clots.      Blood clots are blockages that can form in the body's veins. When a blood clot forms in your deep veins, it may be called a deep vein thrombosis, or DVT for short. Blood clots can happen in any part of the body where blood flows, but they are most common in the arms and legs. If a piece of a blood clot breaks free and travels to the lungs, it is called a pulmonary embolus (PE). A PE can be a very serious problem.         Being in the hospital or having surgery  can raise your chances of getting a blood clot because you may not be well enough to move around as much as you normally do.         Ways you can help prevent blood clots in the hospital         Wearing SCDs. SCDs stands for Sequential Compression Devices.   SCDs are special sleeves that wrap around your legs  They attach to a pump that fills them with air to gently squeeze your legs every few minutes.   This helps return the blood in your legs to your heart.   SCDs should only be taken off when walking or bathing.   SCDs may not be comfortable, but they can help save your life.               Wearing compression stockings - if your doctor orders them. These special snug fitting stockings gently squeeze your legs to help blood flow.       Walking. Walking helps move the blood in your legs.   If your doctor says it is ok, try walking the halls at least   5 times a day. Ask us to help you get up, so you don't fall.      Taking any blood thinning medicines your doctor orders.        Page 1 of 2     [facility]; 3/23   Ways you can help prevent blood clots at home       Wearing compression stockings - if your doctor orders them. ? Walking - to help move the blood in your legs.       Taking any blood thinning medicines your doctor orders.      Signs of a blood clot or PE      Tell your doctor or nurse know right away if you have of the problems listed below.    If you are at home, seek medical care right away. Call 911 for chest pain or problems breathing.               Signs of a blood clot (DVT) - such as pain,  swelling, redness or warmth in your arm or leg      Signs of a pulmonary embolism (PE) - such as chest     pain or feeling short of breath

## 2024-12-16 ENCOUNTER — LAB (OUTPATIENT)
Dept: LAB | Facility: LAB | Age: 59
End: 2024-12-16
Payer: COMMERCIAL

## 2024-12-16 DIAGNOSIS — C54.1 ENDOMETRIAL ADENOCARCINOMA (MULTI): ICD-10-CM

## 2024-12-16 LAB
ABO GROUP (TYPE) IN BLOOD: NORMAL
ANION GAP SERPL CALC-SCNC: 13 MMOL/L (ref 10–20)
ANTIBODY SCREEN: NORMAL
BUN SERPL-MCNC: 11 MG/DL (ref 6–23)
CALCIUM SERPL-MCNC: 9.2 MG/DL (ref 8.6–10.3)
CHLORIDE SERPL-SCNC: 107 MMOL/L (ref 98–107)
CO2 SERPL-SCNC: 27 MMOL/L (ref 21–32)
CREAT SERPL-MCNC: 0.72 MG/DL (ref 0.5–1.05)
EGFRCR SERPLBLD CKD-EPI 2021: >90 ML/MIN/1.73M*2
ERYTHROCYTE [DISTWIDTH] IN BLOOD BY AUTOMATED COUNT: 13.2 % (ref 11.5–14.5)
GLUCOSE SERPL-MCNC: 88 MG/DL (ref 74–99)
HCT VFR BLD AUTO: 40.6 % (ref 36–46)
HGB BLD-MCNC: 12.5 G/DL (ref 12–16)
MCH RBC QN AUTO: 27.6 PG (ref 26–34)
MCHC RBC AUTO-ENTMCNC: 30.8 G/DL (ref 32–36)
MCV RBC AUTO: 90 FL (ref 80–100)
NRBC BLD-RTO: 0 /100 WBCS (ref 0–0)
PLATELET # BLD AUTO: 287 X10*3/UL (ref 150–450)
POTASSIUM SERPL-SCNC: 4 MMOL/L (ref 3.5–5.3)
RBC # BLD AUTO: 4.53 X10*6/UL (ref 4–5.2)
RH FACTOR (ANTIGEN D): NORMAL
SODIUM SERPL-SCNC: 143 MMOL/L (ref 136–145)
WBC # BLD AUTO: 8.9 X10*3/UL (ref 4.4–11.3)

## 2024-12-16 PROCEDURE — 86901 BLOOD TYPING SEROLOGIC RH(D): CPT

## 2024-12-16 PROCEDURE — 85027 COMPLETE CBC AUTOMATED: CPT

## 2024-12-16 PROCEDURE — 80048 BASIC METABOLIC PNL TOTAL CA: CPT

## 2024-12-16 PROCEDURE — 36415 COLL VENOUS BLD VENIPUNCTURE: CPT

## 2024-12-16 PROCEDURE — 86900 BLOOD TYPING SEROLOGIC ABO: CPT

## 2024-12-16 PROCEDURE — 86850 RBC ANTIBODY SCREEN: CPT

## 2024-12-17 ENCOUNTER — OFFICE VISIT (OUTPATIENT)
Dept: PRIMARY CARE | Facility: CLINIC | Age: 59
End: 2024-12-17
Payer: COMMERCIAL

## 2024-12-17 VITALS
BODY MASS INDEX: 34.33 KG/M2 | SYSTOLIC BLOOD PRESSURE: 128 MMHG | HEART RATE: 60 BPM | TEMPERATURE: 98.1 F | OXYGEN SATURATION: 97 % | DIASTOLIC BLOOD PRESSURE: 87 MMHG | WEIGHT: 191.8 LBS

## 2024-12-17 DIAGNOSIS — C54.1 ENDOMETRIAL ADENOCARCINOMA (MULTI): ICD-10-CM

## 2024-12-17 DIAGNOSIS — Z01.818 PREOPERATIVE CLEARANCE: ICD-10-CM

## 2024-12-17 DIAGNOSIS — I10 HYPERTENSION, UNSPECIFIED TYPE: ICD-10-CM

## 2024-12-17 DIAGNOSIS — M54.50 CHRONIC MIDLINE LOW BACK PAIN WITHOUT SCIATICA: Primary | ICD-10-CM

## 2024-12-17 DIAGNOSIS — G89.29 CHRONIC MIDLINE LOW BACK PAIN WITHOUT SCIATICA: Primary | ICD-10-CM

## 2024-12-17 DIAGNOSIS — R94.31 ABNORMAL EKG: ICD-10-CM

## 2024-12-17 PROCEDURE — 1036F TOBACCO NON-USER: CPT | Performed by: NURSE PRACTITIONER

## 2024-12-17 PROCEDURE — 99214 OFFICE O/P EST MOD 30 MIN: CPT | Performed by: NURSE PRACTITIONER

## 2024-12-17 PROCEDURE — 93000 ELECTROCARDIOGRAM COMPLETE: CPT | Performed by: NURSE PRACTITIONER

## 2024-12-17 PROCEDURE — 3079F DIAST BP 80-89 MM HG: CPT | Performed by: NURSE PRACTITIONER

## 2024-12-17 PROCEDURE — 3074F SYST BP LT 130 MM HG: CPT | Performed by: NURSE PRACTITIONER

## 2024-12-17 RX ORDER — AMLODIPINE BESYLATE 5 MG/1
5 TABLET ORAL DAILY
Qty: 90 TABLET | Refills: 1 | Status: SHIPPED | OUTPATIENT
Start: 2024-12-17

## 2024-12-17 RX ORDER — BENAZEPRIL HYDROCHLORIDE 20 MG/1
20 TABLET ORAL DAILY
Qty: 90 TABLET | Refills: 1 | Status: SHIPPED | OUTPATIENT
Start: 2024-12-17

## 2024-12-17 ASSESSMENT — ENCOUNTER SYMPTOMS
FATIGUE: 0
SHORTNESS OF BREATH: 0
WEAKNESS: 0
NAUSEA: 0
NUMBNESS: 0
BACK PAIN: 1
HEADACHES: 0
COUGH: 0
VOMITING: 0
CHILLS: 0
CHEST TIGHTNESS: 0
ABDOMINAL PAIN: 0
FEVER: 0
DIARRHEA: 0
PALPITATIONS: 0
DIZZINESS: 0

## 2024-12-17 NOTE — PATIENT INSTRUCTIONS
Continue to focus on a healthy diet.  Continue medications at the current dose .  Check x-ray of the lower back today.  Echo ordered today.  Follow up in 6 months.

## 2024-12-17 NOTE — PROGRESS NOTES
"Subjective    Dayanara Rosales \"Carrie\" is a 59 y.o. female who presents for back pressure (Says this has been here since last May. ) and Flu Vaccine (Pt already got it. /).    Back Pain  This is a chronic problem. The current episode started more than 1 month ago. The problem occurs 2 to 4 times per day. The problem has been gradually worsening since onset. The pain is present in the sacro-iliac. The quality of the pain is described as aching. The pain does not radiate. The pain is at a severity of 7/10. The pain is The same all the time. The symptoms are aggravated by lying down and sitting. Stiffness is present All day. Pertinent negatives include no abdominal pain, chest pain, fever, headaches, numbness or weakness. Risk factors include history of cancer, menopause and obesity.     She presents to the office today for medication refills and follow up.  She is tolerating medications well at current dose- no side effects. No chest pain, shortness of breath, palpitations or edema. No headaches, numbness, tingling, weakness or vision changes.  No dizziness.  Home blood pressure readings: No home blood pressure readings.  Last eye exam: Goes annually.  Diet: eating much healthier  Exercise: No scheduled exercise.  Weight: Down about 10 lbs    She is still feeling a pressure in her back on the spine and just to the sides. This started in May. The pain is not worse but feels it affects a larger area. Feels a pressure when slouching or laying back. (+) stiffness. The pain does not keep her up at night.  It is not severe.  The pain does not go down the legs.  No numbness/tingling/weakness in the legs. No bowel or bladder issues.  No fever or chills.  Last labs: Reviewed recent labs    Will be having a hysterectomy 12/31/2024 for endometrial cancer.    She reports she needs an EKG for the surgery and could not find a place to do it.     Review of Systems   Constitutional:  Negative for chills, fatigue and fever. "   Eyes:  Negative for visual disturbance.   Respiratory:  Negative for cough, chest tightness and shortness of breath.    Cardiovascular:  Negative for chest pain, palpitations and leg swelling.   Gastrointestinal:  Negative for abdominal pain, diarrhea, nausea and vomiting.   Musculoskeletal:  Positive for back pain.   Neurological:  Negative for dizziness, weakness, numbness and headaches.     Objective   /87 (BP Location: Left arm, Patient Position: Sitting) Comment: electronic Bp machine in office  Pulse 60   Temp 36.7 °C (98.1 °F) (Temporal)   Wt 87 kg (191 lb 12.8 oz)   SpO2 97%   BMI 34.33 kg/m²     Physical Exam  Constitutional:       General: She is not in acute distress.     Appearance: Normal appearance. She is not toxic-appearing.   Eyes:      Extraocular Movements: Extraocular movements intact.      Conjunctiva/sclera: Conjunctivae normal.      Pupils: Pupils are equal, round, and reactive to light.   Cardiovascular:      Rate and Rhythm: Normal rate and regular rhythm.      Pulses: Normal pulses.      Heart sounds: Normal heart sounds, S1 normal and S2 normal. No murmur heard.  Pulmonary:      Effort: Pulmonary effort is normal. No respiratory distress.      Breath sounds: Normal breath sounds.   Abdominal:      General: Bowel sounds are normal.      Palpations: Abdomen is soft.      Tenderness: There is no abdominal tenderness.   Musculoskeletal:      Lumbar back: Tenderness present. No bony tenderness. Normal range of motion. No scoliosis.        Back:       Right lower leg: No edema.      Left lower leg: No edema.      Comments: (+) mild tenderness noted in the area above.   Lymphadenopathy:      Cervical: No cervical adenopathy.   Neurological:      Mental Status: She is alert and oriented to person, place, and time.      Sensory: Sensation is intact.      Motor: Motor function is intact.      Deep Tendon Reflexes: Reflexes are normal and symmetric.   Psychiatric:         Attention and  Perception: Attention normal.         Mood and Affect: Mood and affect normal.         Behavior: Behavior normal. Behavior is cooperative.         Thought Content: Thought content normal.         Cognition and Memory: Cognition normal.         Judgment: Judgment normal.         Assessment/Plan   Problem List Items Addressed This Visit       Hypertension     Well controlled on current medications. Continue.         Relevant Medications    amLODIPine (Norvasc) 5 mg tablet    benazepril (Lotensin) 20 mg tablet    Chronic midline low back pain without sciatica - Primary     Check x-ray today.         Relevant Orders    XR lumbar spine 2-3 views    Endometrial adenocarcinoma (Multi)     Scheduled for a hysterectomy 12/31/24.         Abnormal EKG     No significant change noted from 2012 but does show possible LVH- given HTN, will check ECHO.         Relevant Orders    Transthoracic Echo (TTE) Complete     Other Visit Diagnoses       Preoperative clearance        Relevant Orders    ECG 12 lead (Clinic Performed) (Completed)          It has been a pleasure seeing you today!

## 2024-12-26 ENCOUNTER — HOSPITAL ENCOUNTER (OUTPATIENT)
Dept: CARDIOLOGY | Facility: CLINIC | Age: 59
Discharge: HOME | End: 2024-12-26
Payer: COMMERCIAL

## 2024-12-26 DIAGNOSIS — R94.31 ABNORMAL EKG: ICD-10-CM

## 2024-12-26 LAB
AORTIC VALVE PEAK VELOCITY: 1.38 M/S
AV PEAK GRADIENT: 8 MMHG
AVA (PEAK VEL): 2.46 CM2
EJECTION FRACTION APICAL 4 CHAMBER: 63.8
EJECTION FRACTION: 64 %
LEFT ATRIUM VOLUME AREA LENGTH INDEX BSA: 38.8 ML/M2
LEFT VENTRICLE INTERNAL DIMENSION DIASTOLE: 4.21 CM (ref 3.5–6)
LEFT VENTRICULAR OUTFLOW TRACT DIAMETER: 2.04 CM
MITRAL VALVE E/A RATIO: 0.65
RIGHT VENTRICLE FREE WALL PEAK S': 16 CM/S
TRICUSPID ANNULAR PLANE SYSTOLIC EXCURSION: 2.5 CM

## 2024-12-26 PROCEDURE — 93306 TTE W/DOPPLER COMPLETE: CPT

## 2024-12-26 PROCEDURE — 93306 TTE W/DOPPLER COMPLETE: CPT | Performed by: INTERNAL MEDICINE

## 2024-12-30 ENCOUNTER — ANESTHESIA EVENT (OUTPATIENT)
Dept: OPERATING ROOM | Facility: HOSPITAL | Age: 59
End: 2024-12-30
Payer: COMMERCIAL

## 2024-12-30 ENCOUNTER — HOSPITAL ENCOUNTER (OUTPATIENT)
Dept: RADIOLOGY | Facility: CLINIC | Age: 59
Discharge: HOME | End: 2024-12-30
Payer: COMMERCIAL

## 2024-12-30 DIAGNOSIS — M54.50 CHRONIC MIDLINE LOW BACK PAIN WITHOUT SCIATICA: ICD-10-CM

## 2024-12-30 DIAGNOSIS — G89.29 CHRONIC MIDLINE LOW BACK PAIN WITHOUT SCIATICA: ICD-10-CM

## 2024-12-30 DIAGNOSIS — I51.7 LVH (LEFT VENTRICULAR HYPERTROPHY): Primary | ICD-10-CM

## 2024-12-30 PROCEDURE — 72110 X-RAY EXAM L-2 SPINE 4/>VWS: CPT

## 2024-12-30 PROCEDURE — 72110 X-RAY EXAM L-2 SPINE 4/>VWS: CPT | Performed by: STUDENT IN AN ORGANIZED HEALTH CARE EDUCATION/TRAINING PROGRAM

## 2024-12-31 ENCOUNTER — HOSPITAL ENCOUNTER (OUTPATIENT)
Facility: HOSPITAL | Age: 59
LOS: 1 days | Discharge: HOME | End: 2025-01-01
Attending: STUDENT IN AN ORGANIZED HEALTH CARE EDUCATION/TRAINING PROGRAM | Admitting: STUDENT IN AN ORGANIZED HEALTH CARE EDUCATION/TRAINING PROGRAM
Payer: COMMERCIAL

## 2024-12-31 ENCOUNTER — ANESTHESIA (OUTPATIENT)
Dept: OPERATING ROOM | Facility: HOSPITAL | Age: 59
End: 2024-12-31
Payer: COMMERCIAL

## 2024-12-31 DIAGNOSIS — C54.1 ENDOMETRIAL ADENOCARCINOMA (MULTI): ICD-10-CM

## 2024-12-31 DIAGNOSIS — Z98.890 POSTOPERATIVE STATE: Primary | ICD-10-CM

## 2024-12-31 LAB
ABO GROUP (TYPE) IN BLOOD: NORMAL
ANTIBODY SCREEN: NORMAL
RH FACTOR (ANTIGEN D): NORMAL

## 2024-12-31 PROCEDURE — 3700000002 HC GENERAL ANESTHESIA TIME - EACH INCREMENTAL 1 MINUTE: Performed by: STUDENT IN AN ORGANIZED HEALTH CARE EDUCATION/TRAINING PROGRAM

## 2024-12-31 PROCEDURE — A58571 PR LAPAROSCOPY W TOT HYSTERECTUTERUS <=250 GRAM  W TUBE/OVARY: Performed by: STUDENT IN AN ORGANIZED HEALTH CARE EDUCATION/TRAINING PROGRAM

## 2024-12-31 PROCEDURE — 2500000004 HC RX 250 GENERAL PHARMACY W/ HCPCS (ALT 636 FOR OP/ED)

## 2024-12-31 PROCEDURE — 58571 TLH W/T/O 250 G OR LESS: CPT | Performed by: STUDENT IN AN ORGANIZED HEALTH CARE EDUCATION/TRAINING PROGRAM

## 2024-12-31 PROCEDURE — 86900 BLOOD TYPING SEROLOGIC ABO: CPT | Performed by: STUDENT IN AN ORGANIZED HEALTH CARE EDUCATION/TRAINING PROGRAM

## 2024-12-31 PROCEDURE — 2500000005 HC RX 250 GENERAL PHARMACY W/O HCPCS: Performed by: STUDENT IN AN ORGANIZED HEALTH CARE EDUCATION/TRAINING PROGRAM

## 2024-12-31 PROCEDURE — 2500000001 HC RX 250 WO HCPCS SELF ADMINISTERED DRUGS (ALT 637 FOR MEDICARE OP): Performed by: STUDENT IN AN ORGANIZED HEALTH CARE EDUCATION/TRAINING PROGRAM

## 2024-12-31 PROCEDURE — 3600000009 HC OR TIME - EACH INCREMENTAL 1 MINUTE - PROCEDURE LEVEL FOUR: Performed by: STUDENT IN AN ORGANIZED HEALTH CARE EDUCATION/TRAINING PROGRAM

## 2024-12-31 PROCEDURE — 88112 CYTOPATH CELL ENHANCE TECH: CPT | Mod: TC,MCY | Performed by: STUDENT IN AN ORGANIZED HEALTH CARE EDUCATION/TRAINING PROGRAM

## 2024-12-31 PROCEDURE — 3600000004 HC OR TIME - INITIAL BASE CHARGE - PROCEDURE LEVEL FOUR: Performed by: STUDENT IN AN ORGANIZED HEALTH CARE EDUCATION/TRAINING PROGRAM

## 2024-12-31 PROCEDURE — 7100000001 HC RECOVERY ROOM TIME - INITIAL BASE CHARGE: Performed by: STUDENT IN AN ORGANIZED HEALTH CARE EDUCATION/TRAINING PROGRAM

## 2024-12-31 PROCEDURE — 38570 LAPAROSCOPY LYMPH NODE BIOP: CPT | Performed by: STUDENT IN AN ORGANIZED HEALTH CARE EDUCATION/TRAINING PROGRAM

## 2024-12-31 PROCEDURE — 2500000005 HC RX 250 GENERAL PHARMACY W/O HCPCS: Performed by: ANESTHESIOLOGY

## 2024-12-31 PROCEDURE — 2500000001 HC RX 250 WO HCPCS SELF ADMINISTERED DRUGS (ALT 637 FOR MEDICARE OP)

## 2024-12-31 PROCEDURE — 96372 THER/PROPH/DIAG INJ SC/IM: CPT

## 2024-12-31 PROCEDURE — 2500000004 HC RX 250 GENERAL PHARMACY W/ HCPCS (ALT 636 FOR OP/ED): Performed by: ANESTHESIOLOGY

## 2024-12-31 PROCEDURE — 2500000004 HC RX 250 GENERAL PHARMACY W/ HCPCS (ALT 636 FOR OP/ED): Performed by: STUDENT IN AN ORGANIZED HEALTH CARE EDUCATION/TRAINING PROGRAM

## 2024-12-31 PROCEDURE — 2500000002 HC RX 250 W HCPCS SELF ADMINISTERED DRUGS (ALT 637 FOR MEDICARE OP, ALT 636 FOR OP/ED)

## 2024-12-31 PROCEDURE — 96372 THER/PROPH/DIAG INJ SC/IM: CPT | Performed by: STUDENT IN AN ORGANIZED HEALTH CARE EDUCATION/TRAINING PROGRAM

## 2024-12-31 PROCEDURE — 3700000001 HC GENERAL ANESTHESIA TIME - INITIAL BASE CHARGE: Performed by: STUDENT IN AN ORGANIZED HEALTH CARE EDUCATION/TRAINING PROGRAM

## 2024-12-31 PROCEDURE — 7100000002 HC RECOVERY ROOM TIME - EACH INCREMENTAL 1 MINUTE: Performed by: STUDENT IN AN ORGANIZED HEALTH CARE EDUCATION/TRAINING PROGRAM

## 2024-12-31 PROCEDURE — 7100000011 HC EXTENDED STAY RECOVERY HOURLY - NURSING UNIT

## 2024-12-31 PROCEDURE — 2780000003 HC OR 278 NO HCPCS: Performed by: STUDENT IN AN ORGANIZED HEALTH CARE EDUCATION/TRAINING PROGRAM

## 2024-12-31 PROCEDURE — 38900 IO MAP OF SENT LYMPH NODE: CPT | Performed by: STUDENT IN AN ORGANIZED HEALTH CARE EDUCATION/TRAINING PROGRAM

## 2024-12-31 PROCEDURE — 88307 TISSUE EXAM BY PATHOLOGIST: CPT | Performed by: STUDENT IN AN ORGANIZED HEALTH CARE EDUCATION/TRAINING PROGRAM

## 2024-12-31 PROCEDURE — 88112 CYTOPATH CELL ENHANCE TECH: CPT | Performed by: PATHOLOGY

## 2024-12-31 PROCEDURE — 36415 COLL VENOUS BLD VENIPUNCTURE: CPT | Performed by: STUDENT IN AN ORGANIZED HEALTH CARE EDUCATION/TRAINING PROGRAM

## 2024-12-31 PROCEDURE — 88309 TISSUE EXAM BY PATHOLOGIST: CPT | Performed by: STUDENT IN AN ORGANIZED HEALTH CARE EDUCATION/TRAINING PROGRAM

## 2024-12-31 PROCEDURE — 2720000007 HC OR 272 NO HCPCS: Performed by: STUDENT IN AN ORGANIZED HEALTH CARE EDUCATION/TRAINING PROGRAM

## 2024-12-31 PROCEDURE — 86901 BLOOD TYPING SEROLOGIC RH(D): CPT | Performed by: STUDENT IN AN ORGANIZED HEALTH CARE EDUCATION/TRAINING PROGRAM

## 2024-12-31 PROCEDURE — 88307 TISSUE EXAM BY PATHOLOGIST: CPT | Mod: TC,SUR,WESLAB | Performed by: STUDENT IN AN ORGANIZED HEALTH CARE EDUCATION/TRAINING PROGRAM

## 2024-12-31 RX ORDER — LABETALOL HYDROCHLORIDE 5 MG/ML
5 INJECTION, SOLUTION INTRAVENOUS ONCE AS NEEDED
Status: DISCONTINUED | OUTPATIENT
Start: 2024-12-31 | End: 2024-12-31 | Stop reason: HOSPADM

## 2024-12-31 RX ORDER — ATENOLOL 50 MG/1
50 TABLET ORAL EVERY 24 HOURS
Status: DISCONTINUED | OUTPATIENT
Start: 2024-12-31 | End: 2025-01-01 | Stop reason: HOSPADM

## 2024-12-31 RX ORDER — POLYETHYLENE GLYCOL 3350 17 G/17G
17 POWDER, FOR SOLUTION ORAL DAILY
Status: DISCONTINUED | OUTPATIENT
Start: 2024-12-31 | End: 2025-01-01 | Stop reason: HOSPADM

## 2024-12-31 RX ORDER — LIDOCAINE HYDROCHLORIDE 10 MG/ML
0.1 INJECTION, SOLUTION EPIDURAL; INFILTRATION; INTRACAUDAL; PERINEURAL ONCE
Status: DISCONTINUED | OUTPATIENT
Start: 2024-12-31 | End: 2024-12-31 | Stop reason: HOSPADM

## 2024-12-31 RX ORDER — MIDAZOLAM HYDROCHLORIDE 1 MG/ML
INJECTION INTRAMUSCULAR; INTRAVENOUS AS NEEDED
Status: DISCONTINUED | OUTPATIENT
Start: 2024-12-31 | End: 2024-12-31

## 2024-12-31 RX ORDER — METHOCARBAMOL 100 MG/ML
1000 INJECTION, SOLUTION INTRAMUSCULAR; INTRAVENOUS ONCE
Status: COMPLETED | OUTPATIENT
Start: 2024-12-31 | End: 2024-12-31

## 2024-12-31 RX ORDER — HYDROMORPHONE HYDROCHLORIDE 0.2 MG/ML
0.2 INJECTION INTRAMUSCULAR; INTRAVENOUS; SUBCUTANEOUS EVERY 5 MIN PRN
Status: DISCONTINUED | OUTPATIENT
Start: 2024-12-31 | End: 2024-12-31 | Stop reason: HOSPADM

## 2024-12-31 RX ORDER — OXYCODONE HYDROCHLORIDE 5 MG/1
5 TABLET ORAL EVERY 4 HOURS PRN
Status: DISCONTINUED | OUTPATIENT
Start: 2024-12-31 | End: 2024-12-31 | Stop reason: HOSPADM

## 2024-12-31 RX ORDER — PROPOFOL 10 MG/ML
INJECTION, EMULSION INTRAVENOUS AS NEEDED
Status: DISCONTINUED | OUTPATIENT
Start: 2024-12-31 | End: 2024-12-31

## 2024-12-31 RX ORDER — CELECOXIB 200 MG/1
400 CAPSULE ORAL ONCE
Status: COMPLETED | OUTPATIENT
Start: 2024-12-31 | End: 2024-12-31

## 2024-12-31 RX ORDER — PANTOPRAZOLE SODIUM 40 MG/1
40 TABLET, DELAYED RELEASE ORAL
Status: DISCONTINUED | OUTPATIENT
Start: 2025-01-01 | End: 2025-01-01 | Stop reason: HOSPADM

## 2024-12-31 RX ORDER — SIMETHICONE 80 MG
80 TABLET,CHEWABLE ORAL 4 TIMES DAILY PRN
Status: DISCONTINUED | OUTPATIENT
Start: 2024-12-31 | End: 2025-01-01 | Stop reason: HOSPADM

## 2024-12-31 RX ORDER — ROCURONIUM BROMIDE 10 MG/ML
INJECTION, SOLUTION INTRAVENOUS AS NEEDED
Status: DISCONTINUED | OUTPATIENT
Start: 2024-12-31 | End: 2024-12-31

## 2024-12-31 RX ORDER — HEPARIN SODIUM 5000 [USP'U]/ML
5000 INJECTION, SOLUTION INTRAVENOUS; SUBCUTANEOUS EVERY 8 HOURS
Status: DISCONTINUED | OUTPATIENT
Start: 2024-12-31 | End: 2025-01-01 | Stop reason: HOSPADM

## 2024-12-31 RX ORDER — LIDOCAINE HCL/PF 100 MG/5ML
SYRINGE (ML) INTRAVENOUS AS NEEDED
Status: DISCONTINUED | OUTPATIENT
Start: 2024-12-31 | End: 2024-12-31

## 2024-12-31 RX ORDER — WATER 1 ML/ML
IRRIGANT IRRIGATION AS NEEDED
Status: DISCONTINUED | OUTPATIENT
Start: 2024-12-31 | End: 2024-12-31 | Stop reason: HOSPADM

## 2024-12-31 RX ORDER — ACETAMINOPHEN 325 MG/1
975 TABLET ORAL EVERY 6 HOURS
Status: DISCONTINUED | OUTPATIENT
Start: 2024-12-31 | End: 2025-01-01 | Stop reason: HOSPADM

## 2024-12-31 RX ORDER — BUPIVACAINE HYDROCHLORIDE 5 MG/ML
INJECTION, SOLUTION PERINEURAL AS NEEDED
Status: DISCONTINUED | OUTPATIENT
Start: 2024-12-31 | End: 2024-12-31 | Stop reason: HOSPADM

## 2024-12-31 RX ORDER — DROPERIDOL 2.5 MG/ML
0.62 INJECTION, SOLUTION INTRAMUSCULAR; INTRAVENOUS ONCE AS NEEDED
Status: DISCONTINUED | OUTPATIENT
Start: 2024-12-31 | End: 2024-12-31

## 2024-12-31 RX ORDER — SERTRALINE HYDROCHLORIDE 50 MG/1
50 TABLET, FILM COATED ORAL DAILY
Status: DISCONTINUED | OUTPATIENT
Start: 2024-12-31 | End: 2025-01-01 | Stop reason: HOSPADM

## 2024-12-31 RX ORDER — ATENOLOL 50 MG/1
50 TABLET ORAL DAILY
Status: DISCONTINUED | OUTPATIENT
Start: 2025-01-01 | End: 2024-12-31

## 2024-12-31 RX ORDER — ATENOLOL 50 MG/1
50 TABLET ORAL DAILY
Status: DISCONTINUED | OUTPATIENT
Start: 2024-12-31 | End: 2024-12-31

## 2024-12-31 RX ORDER — ONDANSETRON HYDROCHLORIDE 2 MG/ML
4 INJECTION, SOLUTION INTRAVENOUS EVERY 6 HOURS PRN
Status: DISCONTINUED | OUTPATIENT
Start: 2024-12-31 | End: 2025-01-01 | Stop reason: HOSPADM

## 2024-12-31 RX ORDER — CEFAZOLIN 1 G/1
INJECTION, POWDER, FOR SOLUTION INTRAVENOUS AS NEEDED
Status: DISCONTINUED | OUTPATIENT
Start: 2024-12-31 | End: 2024-12-31

## 2024-12-31 RX ORDER — HYDROMORPHONE HYDROCHLORIDE 1 MG/ML
INJECTION, SOLUTION INTRAMUSCULAR; INTRAVENOUS; SUBCUTANEOUS CONTINUOUS PRN
Status: DISCONTINUED | OUTPATIENT
Start: 2024-12-31 | End: 2024-12-31

## 2024-12-31 RX ORDER — DEXAMETHASONE SODIUM PHOSPHATE 10 MG/ML
INJECTION INTRAMUSCULAR; INTRAVENOUS AS NEEDED
Status: DISCONTINUED | OUTPATIENT
Start: 2024-12-31 | End: 2024-12-31 | Stop reason: HOSPADM

## 2024-12-31 RX ORDER — ALBUTEROL SULFATE 0.83 MG/ML
2.5 SOLUTION RESPIRATORY (INHALATION) ONCE AS NEEDED
Status: DISCONTINUED | OUTPATIENT
Start: 2024-12-31 | End: 2024-12-31 | Stop reason: HOSPADM

## 2024-12-31 RX ORDER — NALOXONE HYDROCHLORIDE 0.4 MG/ML
0.1 INJECTION, SOLUTION INTRAMUSCULAR; INTRAVENOUS; SUBCUTANEOUS EVERY 5 MIN PRN
Status: DISCONTINUED | OUTPATIENT
Start: 2024-12-31 | End: 2025-01-01 | Stop reason: HOSPADM

## 2024-12-31 RX ORDER — FENTANYL CITRATE 50 UG/ML
INJECTION, SOLUTION INTRAMUSCULAR; INTRAVENOUS AS NEEDED
Status: DISCONTINUED | OUTPATIENT
Start: 2024-12-31 | End: 2024-12-31

## 2024-12-31 RX ORDER — TRAMADOL HYDROCHLORIDE 50 MG/1
100 TABLET ORAL EVERY 6 HOURS PRN
Status: DISCONTINUED | OUTPATIENT
Start: 2024-12-31 | End: 2025-01-01 | Stop reason: HOSPADM

## 2024-12-31 RX ORDER — TRAMADOL HYDROCHLORIDE 50 MG/1
50 TABLET ORAL EVERY 6 HOURS PRN
Status: DISCONTINUED | OUTPATIENT
Start: 2024-12-31 | End: 2025-01-01 | Stop reason: HOSPADM

## 2024-12-31 RX ORDER — HYDROMORPHONE HYDROCHLORIDE 0.2 MG/ML
0.2 INJECTION INTRAMUSCULAR; INTRAVENOUS; SUBCUTANEOUS EVERY 2 HOUR PRN
Status: DISCONTINUED | OUTPATIENT
Start: 2024-12-31 | End: 2025-01-01 | Stop reason: HOSPADM

## 2024-12-31 RX ORDER — MIDAZOLAM HYDROCHLORIDE 1 MG/ML
0.5 INJECTION INTRAMUSCULAR; INTRAVENOUS
Status: DISCONTINUED | OUTPATIENT
Start: 2024-12-31 | End: 2024-12-31 | Stop reason: HOSPADM

## 2024-12-31 RX ORDER — GABAPENTIN 600 MG/1
600 TABLET ORAL ONCE
Status: COMPLETED | OUTPATIENT
Start: 2024-12-31 | End: 2024-12-31

## 2024-12-31 RX ORDER — SODIUM CHLORIDE 0.9 G/100ML
IRRIGANT IRRIGATION AS NEEDED
Status: DISCONTINUED | OUTPATIENT
Start: 2024-12-31 | End: 2024-12-31 | Stop reason: HOSPADM

## 2024-12-31 RX ORDER — ACETAMINOPHEN 325 MG/1
975 TABLET ORAL ONCE
Status: COMPLETED | OUTPATIENT
Start: 2024-12-31 | End: 2024-12-31

## 2024-12-31 RX ORDER — INDOCYANINE GREEN AND WATER 25 MG
KIT INJECTION AS NEEDED
Status: DISCONTINUED | OUTPATIENT
Start: 2024-12-31 | End: 2024-12-31 | Stop reason: HOSPADM

## 2024-12-31 RX ORDER — ACETAMINOPHEN 325 MG/1
650 TABLET ORAL EVERY 4 HOURS PRN
Status: DISCONTINUED | OUTPATIENT
Start: 2024-12-31 | End: 2024-12-31 | Stop reason: HOSPADM

## 2024-12-31 RX ORDER — AMLODIPINE BESYLATE 5 MG/1
5 TABLET ORAL DAILY
Status: DISCONTINUED | OUTPATIENT
Start: 2024-12-31 | End: 2025-01-01 | Stop reason: HOSPADM

## 2024-12-31 RX ORDER — DIPHENHYDRAMINE HYDROCHLORIDE 50 MG/ML
12.5 INJECTION INTRAMUSCULAR; INTRAVENOUS ONCE AS NEEDED
Status: DISCONTINUED | OUTPATIENT
Start: 2024-12-31 | End: 2024-12-31 | Stop reason: HOSPADM

## 2024-12-31 RX ORDER — HEPARIN SODIUM 5000 [USP'U]/ML
5000 INJECTION, SOLUTION INTRAVENOUS; SUBCUTANEOUS ONCE
Status: COMPLETED | OUTPATIENT
Start: 2024-12-31 | End: 2024-12-31

## 2024-12-31 RX ORDER — METRONIDAZOLE 500 MG/100ML
INJECTION, SOLUTION INTRAVENOUS AS NEEDED
Status: DISCONTINUED | OUTPATIENT
Start: 2024-12-31 | End: 2024-12-31

## 2024-12-31 RX ORDER — HYDRALAZINE HYDROCHLORIDE 20 MG/ML
5 INJECTION INTRAMUSCULAR; INTRAVENOUS EVERY 30 MIN PRN
Status: DISCONTINUED | OUTPATIENT
Start: 2024-12-31 | End: 2024-12-31 | Stop reason: HOSPADM

## 2024-12-31 RX ORDER — ONDANSETRON HYDROCHLORIDE 2 MG/ML
INJECTION, SOLUTION INTRAVENOUS AS NEEDED
Status: DISCONTINUED | OUTPATIENT
Start: 2024-12-31 | End: 2024-12-31

## 2024-12-31 RX ORDER — SODIUM CHLORIDE, SODIUM LACTATE, POTASSIUM CHLORIDE, CALCIUM CHLORIDE 600; 310; 30; 20 MG/100ML; MG/100ML; MG/100ML; MG/100ML
40 INJECTION, SOLUTION INTRAVENOUS CONTINUOUS
Status: DISCONTINUED | OUTPATIENT
Start: 2024-12-31 | End: 2025-01-01 | Stop reason: HOSPADM

## 2024-12-31 RX ORDER — IBUPROFEN 600 MG/1
600 TABLET ORAL EVERY 6 HOURS
Status: DISCONTINUED | OUTPATIENT
Start: 2025-01-02 | End: 2025-01-01 | Stop reason: HOSPADM

## 2024-12-31 RX ORDER — PHENYLEPHRINE HCL IN 0.9% NACL 0.4MG/10ML
SYRINGE (ML) INTRAVENOUS AS NEEDED
Status: DISCONTINUED | OUTPATIENT
Start: 2024-12-31 | End: 2024-12-31

## 2024-12-31 RX ORDER — ONDANSETRON HYDROCHLORIDE 2 MG/ML
4 INJECTION, SOLUTION INTRAVENOUS ONCE AS NEEDED
Status: DISCONTINUED | OUTPATIENT
Start: 2024-12-31 | End: 2024-12-31 | Stop reason: HOSPADM

## 2024-12-31 RX ORDER — KETOROLAC TROMETHAMINE 30 MG/ML
30 INJECTION, SOLUTION INTRAMUSCULAR; INTRAVENOUS EVERY 6 HOURS
Status: DISCONTINUED | OUTPATIENT
Start: 2024-12-31 | End: 2025-01-01 | Stop reason: HOSPADM

## 2024-12-31 RX ORDER — SODIUM CHLORIDE, SODIUM LACTATE, POTASSIUM CHLORIDE, CALCIUM CHLORIDE 600; 310; 30; 20 MG/100ML; MG/100ML; MG/100ML; MG/100ML
INJECTION, SOLUTION INTRAVENOUS CONTINUOUS PRN
Status: DISCONTINUED | OUTPATIENT
Start: 2024-12-31 | End: 2024-12-31

## 2024-12-31 RX ADMIN — METRONIDAZOLE 500 MG: 5 INJECTION, SOLUTION INTRAVENOUS at 13:33

## 2024-12-31 RX ADMIN — PROPOFOL 40 MG: 10 INJECTION, EMULSION INTRAVENOUS at 13:49

## 2024-12-31 RX ADMIN — FENTANYL CITRATE 25 MCG: 50 INJECTION, SOLUTION INTRAMUSCULAR; INTRAVENOUS at 15:07

## 2024-12-31 RX ADMIN — MIDAZOLAM HYDROCHLORIDE 2 MG: 1 INJECTION, SOLUTION INTRAMUSCULAR; INTRAVENOUS at 13:09

## 2024-12-31 RX ADMIN — ROCURONIUM 10 MG: 50 INJECTION, SOLUTION INTRAVENOUS at 15:17

## 2024-12-31 RX ADMIN — METHOCARBAMOL 1000 MG: 1000 INJECTION, SOLUTION INTRAMUSCULAR; INTRAVENOUS at 16:55

## 2024-12-31 RX ADMIN — ONDANSETRON 4 MG: 2 INJECTION, SOLUTION INTRAMUSCULAR; INTRAVENOUS at 15:48

## 2024-12-31 RX ADMIN — Medication 120 MCG: at 13:20

## 2024-12-31 RX ADMIN — DEXAMETHASONE SODIUM PHOSPHATE 4 MG: 4 INJECTION INTRA-ARTICULAR; INTRALESIONAL; INTRAMUSCULAR; INTRAVENOUS; SOFT TISSUE at 13:24

## 2024-12-31 RX ADMIN — LIDOCAINE HYDROCHLORIDE 100 MG: 20 INJECTION INTRAVENOUS at 13:19

## 2024-12-31 RX ADMIN — SODIUM CHLORIDE, POTASSIUM CHLORIDE, SODIUM LACTATE AND CALCIUM CHLORIDE 40 ML/HR: 600; 310; 30; 20 INJECTION, SOLUTION INTRAVENOUS at 18:36

## 2024-12-31 RX ADMIN — POLYETHYLENE GLYCOL 3350 17 G: 17 POWDER, FOR SOLUTION ORAL at 20:20

## 2024-12-31 RX ADMIN — PROPOFOL 10 MG: 10 INJECTION, EMULSION INTRAVENOUS at 15:51

## 2024-12-31 RX ADMIN — KETOROLAC TROMETHAMINE 30 MG: 30 INJECTION, SOLUTION INTRAMUSCULAR; INTRAVENOUS at 20:20

## 2024-12-31 RX ADMIN — Medication 4 L/MIN: at 16:16

## 2024-12-31 RX ADMIN — ROCURONIUM 50 MG: 50 INJECTION, SOLUTION INTRAVENOUS at 13:20

## 2024-12-31 RX ADMIN — ROCURONIUM 10 MG: 50 INJECTION, SOLUTION INTRAVENOUS at 14:47

## 2024-12-31 RX ADMIN — SUGAMMADEX 200 MG: 100 INJECTION, SOLUTION INTRAVENOUS at 16:05

## 2024-12-31 RX ADMIN — AMLODIPINE BESYLATE 5 MG: 5 TABLET ORAL at 20:20

## 2024-12-31 RX ADMIN — ATENOLOL 50 MG: 50 TABLET ORAL at 20:20

## 2024-12-31 RX ADMIN — TRAMADOL HYDROCHLORIDE 50 MG: 50 TABLET, COATED ORAL at 18:36

## 2024-12-31 RX ADMIN — GABAPENTIN 600 MG: 600 TABLET, FILM COATED ORAL at 09:14

## 2024-12-31 RX ADMIN — ACETAMINOPHEN 975 MG: 325 TABLET ORAL at 18:36

## 2024-12-31 RX ADMIN — FENTANYL CITRATE 50 MCG: 50 INJECTION, SOLUTION INTRAMUSCULAR; INTRAVENOUS at 13:19

## 2024-12-31 RX ADMIN — HEPARIN SODIUM 5000 UNITS: 5000 INJECTION, SOLUTION INTRAVENOUS; SUBCUTANEOUS at 18:36

## 2024-12-31 RX ADMIN — SERTRALINE 50 MG: 50 TABLET, FILM COATED ORAL at 20:20

## 2024-12-31 RX ADMIN — SODIUM CHLORIDE, POTASSIUM CHLORIDE, SODIUM LACTATE AND CALCIUM CHLORIDE: 600; 310; 30; 20 INJECTION, SOLUTION INTRAVENOUS at 13:00

## 2024-12-31 RX ADMIN — HEPARIN SODIUM 5000 UNITS: 5000 INJECTION, SOLUTION INTRAVENOUS; SUBCUTANEOUS at 09:28

## 2024-12-31 RX ADMIN — FENTANYL CITRATE 25 MCG: 50 INJECTION, SOLUTION INTRAMUSCULAR; INTRAVENOUS at 13:50

## 2024-12-31 RX ADMIN — SUGAMMADEX 100 MG: 100 INJECTION, SOLUTION INTRAVENOUS at 16:09

## 2024-12-31 RX ADMIN — Medication 80 MCG: at 16:00

## 2024-12-31 RX ADMIN — CEFAZOLIN 2 G: 1 INJECTION, POWDER, FOR SOLUTION INTRAMUSCULAR; INTRAVENOUS at 13:31

## 2024-12-31 RX ADMIN — CELECOXIB 400 MG: 200 CAPSULE ORAL at 09:14

## 2024-12-31 RX ADMIN — FENTANYL CITRATE 25 MCG: 50 INJECTION, SOLUTION INTRAMUSCULAR; INTRAVENOUS at 14:49

## 2024-12-31 RX ADMIN — ACETAMINOPHEN 975 MG: 325 TABLET ORAL at 09:14

## 2024-12-31 RX ADMIN — PROPOFOL 150 MG: 10 INJECTION, EMULSION INTRAVENOUS at 13:19

## 2024-12-31 RX ADMIN — FENTANYL CITRATE 25 MCG: 50 INJECTION, SOLUTION INTRAMUSCULAR; INTRAVENOUS at 15:51

## 2024-12-31 RX ADMIN — FENTANYL CITRATE 50 MCG: 50 INJECTION, SOLUTION INTRAMUSCULAR; INTRAVENOUS at 15:48

## 2024-12-31 SDOH — SOCIAL STABILITY: SOCIAL INSECURITY: DO YOU FEEL UNSAFE GOING BACK TO THE PLACE WHERE YOU ARE LIVING?: NO

## 2024-12-31 SDOH — ECONOMIC STABILITY: FOOD INSECURITY: HOW HARD IS IT FOR YOU TO PAY FOR THE VERY BASICS LIKE FOOD, HOUSING, MEDICAL CARE, AND HEATING?: PATIENT DECLINED

## 2024-12-31 SDOH — SOCIAL STABILITY: SOCIAL INSECURITY: WITHIN THE LAST YEAR, HAVE YOU BEEN AFRAID OF YOUR PARTNER OR EX-PARTNER?: PATIENT DECLINED

## 2024-12-31 SDOH — ECONOMIC STABILITY: FOOD INSECURITY: WITHIN THE PAST 12 MONTHS, THE FOOD YOU BOUGHT JUST DIDN'T LAST AND YOU DIDN'T HAVE MONEY TO GET MORE.: PATIENT DECLINED

## 2024-12-31 SDOH — SOCIAL STABILITY: SOCIAL NETWORK: HOW OFTEN DO YOU ATTEND MEETINGS OF THE CLUBS OR ORGANIZATIONS YOU BELONG TO?: PATIENT DECLINED

## 2024-12-31 SDOH — ECONOMIC STABILITY: TRANSPORTATION INSECURITY
IN THE PAST 12 MONTHS, HAS LACK OF TRANSPORTATION KEPT YOU FROM MEDICAL APPOINTMENTS OR FROM GETTING MEDICATIONS?: PATIENT DECLINED

## 2024-12-31 SDOH — SOCIAL STABILITY: SOCIAL INSECURITY
WITHIN THE LAST YEAR, HAVE YOU BEEN RAPED OR FORCED TO HAVE ANY KIND OF SEXUAL ACTIVITY BY YOUR PARTNER OR EX-PARTNER?: PATIENT DECLINED

## 2024-12-31 SDOH — HEALTH STABILITY: PHYSICAL HEALTH
HOW OFTEN DO YOU NEED TO HAVE SOMEONE HELP YOU WHEN YOU READ INSTRUCTIONS, PAMPHLETS, OR OTHER WRITTEN MATERIAL FROM YOUR DOCTOR OR PHARMACY?: NEVER

## 2024-12-31 SDOH — ECONOMIC STABILITY: HOUSING INSECURITY: IN THE LAST 12 MONTHS, WAS THERE A TIME WHEN YOU WERE NOT ABLE TO PAY THE MORTGAGE OR RENT ON TIME?: PATIENT DECLINED

## 2024-12-31 SDOH — SOCIAL STABILITY: SOCIAL INSECURITY: DO YOU FEEL ANYONE HAS EXPLOITED OR TAKEN ADVANTAGE OF YOU FINANCIALLY OR OF YOUR PERSONAL PROPERTY?: NO

## 2024-12-31 SDOH — HEALTH STABILITY: PHYSICAL HEALTH: ON AVERAGE, HOW MANY MINUTES DO YOU ENGAGE IN EXERCISE AT THIS LEVEL?: PATIENT DECLINED

## 2024-12-31 SDOH — ECONOMIC STABILITY: FOOD INSECURITY
WITHIN THE PAST 12 MONTHS, YOU WORRIED THAT YOUR FOOD WOULD RUN OUT BEFORE YOU GOT THE MONEY TO BUY MORE.: PATIENT DECLINED

## 2024-12-31 SDOH — SOCIAL STABILITY: SOCIAL NETWORK: HOW OFTEN DO YOU GET TOGETHER WITH FRIENDS OR RELATIVES?: PATIENT DECLINED

## 2024-12-31 SDOH — SOCIAL STABILITY: SOCIAL INSECURITY: DOES ANYONE TRY TO KEEP YOU FROM HAVING/CONTACTING OTHER FRIENDS OR DOING THINGS OUTSIDE YOUR HOME?: NO

## 2024-12-31 SDOH — SOCIAL STABILITY: SOCIAL NETWORK: IN A TYPICAL WEEK, HOW MANY TIMES DO YOU TALK ON THE PHONE WITH FAMILY, FRIENDS, OR NEIGHBORS?: PATIENT DECLINED

## 2024-12-31 SDOH — HEALTH STABILITY: PHYSICAL HEALTH
ON AVERAGE, HOW MANY DAYS PER WEEK DO YOU ENGAGE IN MODERATE TO STRENUOUS EXERCISE (LIKE A BRISK WALK)?: PATIENT DECLINED

## 2024-12-31 SDOH — SOCIAL STABILITY: SOCIAL INSECURITY: HAS ANYONE EVER THREATENED TO HURT YOUR FAMILY OR YOUR PETS?: NO

## 2024-12-31 SDOH — SOCIAL STABILITY: SOCIAL INSECURITY
WITHIN THE LAST YEAR, HAVE YOU BEEN KICKED, HIT, SLAPPED, OR OTHERWISE PHYSICALLY HURT BY YOUR PARTNER OR EX-PARTNER?: PATIENT DECLINED

## 2024-12-31 SDOH — ECONOMIC STABILITY: HOUSING INSECURITY: AT ANY TIME IN THE PAST 12 MONTHS, WERE YOU HOMELESS OR LIVING IN A SHELTER (INCLUDING NOW)?: PATIENT DECLINED

## 2024-12-31 SDOH — SOCIAL STABILITY: SOCIAL INSECURITY: ARE YOU MARRIED, WIDOWED, DIVORCED, SEPARATED, NEVER MARRIED, OR LIVING WITH A PARTNER?: PATIENT DECLINED

## 2024-12-31 SDOH — HEALTH STABILITY: MENTAL HEALTH
DO YOU FEEL STRESS - TENSE, RESTLESS, NERVOUS, OR ANXIOUS, OR UNABLE TO SLEEP AT NIGHT BECAUSE YOUR MIND IS TROUBLED ALL THE TIME - THESE DAYS?: PATIENT DECLINED

## 2024-12-31 SDOH — SOCIAL STABILITY: SOCIAL INSECURITY: ARE YOU OR HAVE YOU BEEN THREATENED OR ABUSED PHYSICALLY, EMOTIONALLY, OR SEXUALLY BY ANYONE?: NO

## 2024-12-31 SDOH — ECONOMIC STABILITY: HOUSING INSECURITY: IN THE PAST 12 MONTHS, HOW MANY TIMES HAVE YOU MOVED WHERE YOU WERE LIVING?: 0

## 2024-12-31 SDOH — SOCIAL STABILITY: SOCIAL INSECURITY: ABUSE: ADULT

## 2024-12-31 SDOH — SOCIAL STABILITY: SOCIAL INSECURITY: HAVE YOU HAD THOUGHTS OF HARMING ANYONE ELSE?: NO

## 2024-12-31 SDOH — SOCIAL STABILITY: SOCIAL INSECURITY
WITHIN THE LAST YEAR, HAVE YOU BEEN HUMILIATED OR EMOTIONALLY ABUSED IN OTHER WAYS BY YOUR PARTNER OR EX-PARTNER?: PATIENT DECLINED

## 2024-12-31 SDOH — SOCIAL STABILITY: SOCIAL NETWORK
DO YOU BELONG TO ANY CLUBS OR ORGANIZATIONS SUCH AS CHURCH GROUPS, UNIONS, FRATERNAL OR ATHLETIC GROUPS, OR SCHOOL GROUPS?: PATIENT DECLINED

## 2024-12-31 SDOH — SOCIAL STABILITY: SOCIAL INSECURITY: WERE YOU ABLE TO COMPLETE ALL THE BEHAVIORAL HEALTH SCREENINGS?: YES

## 2024-12-31 SDOH — ECONOMIC STABILITY: INCOME INSECURITY
IN THE PAST 12 MONTHS HAS THE ELECTRIC, GAS, OIL, OR WATER COMPANY THREATENED TO SHUT OFF SERVICES IN YOUR HOME?: PATIENT DECLINED

## 2024-12-31 SDOH — SOCIAL STABILITY: SOCIAL INSECURITY: HAVE YOU HAD ANY THOUGHTS OF HARMING ANYONE ELSE?: NO

## 2024-12-31 SDOH — SOCIAL STABILITY: SOCIAL NETWORK: HOW OFTEN DO YOU ATTEND CHURCH OR RELIGIOUS SERVICES?: PATIENT DECLINED

## 2024-12-31 SDOH — SOCIAL STABILITY: SOCIAL INSECURITY: ARE THERE ANY APPARENT SIGNS OF INJURIES/BEHAVIORS THAT COULD BE RELATED TO ABUSE/NEGLECT?: NO

## 2024-12-31 SDOH — HEALTH STABILITY: MENTAL HEALTH: CURRENT SMOKER: 0

## 2024-12-31 ASSESSMENT — ACTIVITIES OF DAILY LIVING (ADL)
ADEQUATE_TO_COMPLETE_ADL: YES
DRESSING YOURSELF: INDEPENDENT
BATHING: INDEPENDENT
WALKS IN HOME: INDEPENDENT
TOILETING: INDEPENDENT
PATIENT'S MEMORY ADEQUATE TO SAFELY COMPLETE DAILY ACTIVITIES?: YES
GROOMING: INDEPENDENT
LACK_OF_TRANSPORTATION: PATIENT DECLINED
HEARING - RIGHT EAR: FUNCTIONAL
JUDGMENT_ADEQUATE_SAFELY_COMPLETE_DAILY_ACTIVITIES: YES
HEARING - LEFT EAR: FUNCTIONAL
LACK_OF_TRANSPORTATION: PATIENT DECLINED
FEEDING YOURSELF: INDEPENDENT

## 2024-12-31 ASSESSMENT — COGNITIVE AND FUNCTIONAL STATUS - GENERAL
DAILY ACTIVITIY SCORE: 24
DAILY ACTIVITIY SCORE: 24
PATIENT BASELINE BEDBOUND: NO
MOBILITY SCORE: 24
DAILY ACTIVITIY SCORE: 24
MOBILITY SCORE: 24
MOBILITY SCORE: 24

## 2024-12-31 ASSESSMENT — LIFESTYLE VARIABLES
SUBSTANCE_ABUSE_PAST_12_MONTHS: NO
SKIP TO QUESTIONS 9-10: 0
AUDIT-C TOTAL SCORE: -1
HOW OFTEN DO YOU HAVE A DRINK CONTAINING ALCOHOL: PATIENT DECLINED
HOW MANY STANDARD DRINKS CONTAINING ALCOHOL DO YOU HAVE ON A TYPICAL DAY: PATIENT DECLINED
HOW OFTEN DO YOU HAVE 6 OR MORE DRINKS ON ONE OCCASION: PATIENT DECLINED
PRESCIPTION_ABUSE_PAST_12_MONTHS: NO
AUDIT-C TOTAL SCORE: -1

## 2024-12-31 ASSESSMENT — PAIN SCALES - GENERAL
PAINLEVEL_OUTOF10: 4
PAINLEVEL_OUTOF10: 6
PAINLEVEL_OUTOF10: 6
PAINLEVEL_OUTOF10: 4

## 2024-12-31 ASSESSMENT — PAIN - FUNCTIONAL ASSESSMENT
PAIN_FUNCTIONAL_ASSESSMENT: 0-10

## 2024-12-31 ASSESSMENT — PATIENT HEALTH QUESTIONNAIRE - PHQ9
2. FEELING DOWN, DEPRESSED OR HOPELESS: NOT AT ALL
SUM OF ALL RESPONSES TO PHQ9 QUESTIONS 1 & 2: 0
1. LITTLE INTEREST OR PLEASURE IN DOING THINGS: NOT AT ALL

## 2024-12-31 ASSESSMENT — COLUMBIA-SUICIDE SEVERITY RATING SCALE - C-SSRS
1. IN THE PAST MONTH, HAVE YOU WISHED YOU WERE DEAD OR WISHED YOU COULD GO TO SLEEP AND NOT WAKE UP?: NO
6. HAVE YOU EVER DONE ANYTHING, STARTED TO DO ANYTHING, OR PREPARED TO DO ANYTHING TO END YOUR LIFE?: NO
2. HAVE YOU ACTUALLY HAD ANY THOUGHTS OF KILLING YOURSELF?: NO

## 2024-12-31 NOTE — ANESTHESIA PROCEDURE NOTES
Airway  Date/Time: 12/31/2024 1:22 PM  Urgency: elective    Airway not difficult    Staffing  Performed: resident   Authorized by: Luciano Gilliland MD    Performed by: Priya Frederick MD  Patient location during procedure: OR    Indications and Patient Condition  Indications for airway management: anesthesia  Spontaneous Ventilation: absent  Sedation level: deep  Preoxygenated: yes  Patient position: sniffing  Mask difficulty assessment: 1 - vent by mask  Planned trial extubation    Final Airway Details  Final airway type: endotracheal airway      Successful airway: ETT  Cuffed: yes   Successful intubation technique: direct laryngoscopy  Facilitating devices/methods: intubating stylet  Endotracheal tube insertion site: oral  Blade: Sandra  Blade size: #3  ETT size (mm): 7.0  Cormack-Lehane Classification: grade I - full view of glottis  Placement verified by: chest auscultation and capnometry   Inital cuff pressure (cm H2O): 5  Measured from: lips  ETT to lips (cm): 22  Number of attempts at approach: 1

## 2024-12-31 NOTE — HOSPITAL COURSE
"[x] PAT  [x] Plan for overnight obs? YES  [x] Consent  [x] Preop meds  [x] Add to list    Gynecologic Oncology Surgical History and Physical    Dayanara Rosales \"Carrie\" is a 59 y.o. female presenting for laparoscopic hysterectomy, BSO, SNLD in the setting of endometrial adenocarcinoma, endometrioid type FIGO grade 1.     PAT : cleared   *pt has R pelvic kidney     Tumor History:  Imaging: TVUS   Abnormally thickened endometrium. Tissue sampling is recommended to  assess for endometrial hyperplasia versus carcinoma. Yellow Alert.      Suspected hepatic steatosis. Possible right-sided hydronephrosis  versus parapelvic cysts. Recommend further assessment with right  upper quadrant ultrasound.    Pathology:  A. Endometrium, biopsy:   -Endometrial adenocarcinoma, endometrioid type, FIGO grade 1.  See note.    Tumor Markers:  No results found for: \"\", \"\", \"CEA\"    Obstetrical History   OB History    Para Term  AB Living   2 2 2         SAB IAB Ectopic Multiple Live Births                  # Outcome Date GA Lbr Andreas/2nd Weight Sex Type Anes PTL Lv   2 Term            1 Term                Past Medical History  Past Medical History:   Diagnosis Date    Arthritis     Depression, unspecified     Dorsopathy, unspecified     Back problem    Endometrial cancer (Multi)     seen by Dr. Shantel Motley on 10/24/24    Essential (primary) hypertension     Gastro-esophageal reflux disease without esophagitis     Headache, unspecified     Hepatic steatosis     Noted 2024 on US Pelvis--Suspected hepatic steatosis    Hiatal hernia     IBS (irritable bowel syndrome)     MORGAN (nonalcoholic steatohepatitis)     RJ (obstructive sleep apnea)     No CPAP    Palpitations     Panic disorder (episodic paroxysmal anxiety)     Pelvic kidney     Postmenopausal bleeding     Vision loss     Wears corrective lens    Vitamin D deficiency, unspecified         Past Surgical History   Past Surgical History:   Procedure " Laterality Date    CHOLECYSTECTOMY      Laparoscopic    COLONOSCOPY      ENDOMETRIAL BIOPSY      TONSILLECTOMY  02/25/2014       Family History:  Family History   Problem Relation Name Age of Onset    Hyperlipidemia Mother R Román     Hypertension Mother R Román     Macular degeneration Mother R Román     Breast cancer Mother R Román 51    Pancreatic cancer Mother R Román 91    Vision loss Mother R Román     Cancer Mother R Román     Hyperlipidemia Father M Román     Hypertension Father M Román     Skin cancer Father M Román     Other (Chronic lymphocytic leukemia) Father M Román 80 - 89    Pancreatic cancer Brother J Román 64    Cancer Brother J Román        Social History  Social History     Tobacco Use    Smoking status: Never     Passive exposure: Never    Smokeless tobacco: Never   Substance Use Topics    Alcohol use: Not Currently     Alcohol/week: 1.0 - 2.0 standard drink of alcohol     Types: 1 - 2 Glasses of wine per week     Comment: 1-2 per week     Substance and Sexual Activity   Drug Use Never       Allergies  Patient has no known allergies.     Medications  No medications prior to admission.       ROS: negative except per HPI    Objective    Last Vitals  There were no vitals taken for this visit.    Physical Examination  General: no acute distress  HEENT: normocephalic, atraumatic  Heart: warm and well perfused  Lungs: breathing comfortably on room air  Abdomen: nondistended  Extremities: moving all extremities  Neuro: awake and conversant  Psych: appropriate mood and affect    Lab Review          Lab Results   Component Value Date    WBC 8.9 12/16/2024    HGB 12.5 12/16/2024    HCT 40.6 12/16/2024    MCV 90 12/16/2024     12/16/2024       Lab Results   Component Value Date    GLUCOSE 88 12/16/2024    CALCIUM 9.2 12/16/2024     12/16/2024    K 4.0 12/16/2024    CO2 27 12/16/2024     12/16/2024    BUN 11 12/16/2024    CREATININE 0.72 12/16/2024  "      Assessment/Plan     Dayanara Rosales \"Carrie\" is a 59 y.o. presenting for scheduled surgery.    Plan to proceed with laparoscopic hysterectomy, BSO, SNLD.  Surgical consent was reviewed. The risks of surgery were discussed including: bleeding (including need for blood transfusion in life-threatening situations; risks of transfusion), infection, damage to surrounding organs. The patient had the opportunity to answer questions and desired to proceed with surgery following our discussion. Both verbal and written consents were obtained.    Pearl Pringle MD  PGY-3, Obstetrics and Gynecology  Gynecologic Oncology, pager: [phone]      "

## 2024-12-31 NOTE — H&P
"  Gynecologic Oncology Surgical History and Physical    Dayanara Rosales \"Carrie\" is a 59 y.o. female presenting for laparoscopic hysterectomy, BSO, SNLD in the setting of endometrial adenocarcinoma, endometrioid type FIGO grade 1.     PAT : cleared   *pt has R pelvic kidney     Tumor History:  Imaging: TVUS   Abnormally thickened endometrium. Tissue sampling is recommended to  assess for endometrial hyperplasia versus carcinoma. Yellow Alert.      Suspected hepatic steatosis. Possible right-sided hydronephrosis  versus parapelvic cysts. Recommend further assessment with right  upper quadrant ultrasound.    Pathology:  A. Endometrium, biopsy:   -Endometrial adenocarcinoma, endometrioid type, FIGO grade 1.  See note.    Tumor Markers:  No results found for: \"\", \"\", \"CEA\"    Obstetrical History   OB History    Para Term  AB Living   2 2 2         SAB IAB Ectopic Multiple Live Births                  # Outcome Date GA Lbr Andreas/2nd Weight Sex Type Anes PTL Lv   2 Term            1 Term                Past Medical History  Past Medical History:   Diagnosis Date    Arthritis     Depression, unspecified     Dorsopathy, unspecified     Back problem    Endometrial cancer (Multi)     seen by Dr. Shantel Motley on 10/24/24    Essential (primary) hypertension     Gastro-esophageal reflux disease without esophagitis     Headache, unspecified     Hepatic steatosis     Noted 2024 on US Pelvis--Suspected hepatic steatosis    Hiatal hernia     IBS (irritable bowel syndrome)     MORGAN (nonalcoholic steatohepatitis)     RJ (obstructive sleep apnea)     No CPAP    Palpitations     Panic disorder (episodic paroxysmal anxiety)     Pelvic kidney     Postmenopausal bleeding     Vision loss     Wears corrective lens    Vitamin D deficiency, unspecified         Past Surgical History   Past Surgical History:   Procedure Laterality Date    CHOLECYSTECTOMY      Laparoscopic    COLONOSCOPY      ENDOMETRIAL BIOPSY " "     TONSILLECTOMY  02/25/2014       Family History:  Family History   Problem Relation Name Age of Onset    Hyperlipidemia Mother R Román     Hypertension Mother R Román     Macular degeneration Mother R Román     Breast cancer Mother R Román 51    Pancreatic cancer Mother R Román 91    Vision loss Mother R Román     Cancer Mother R Román     Hyperlipidemia Father M Román     Hypertension Father M Román     Skin cancer Father M Román     Other (Chronic lymphocytic leukemia) Father M Román 80 - 89    Pancreatic cancer Brother J Román 64    Cancer Brother J Román        Social History  Social History     Tobacco Use    Smoking status: Never     Passive exposure: Never    Smokeless tobacco: Never   Substance Use Topics    Alcohol use: Not Currently     Alcohol/week: 1.0 - 2.0 standard drink of alcohol     Types: 1 - 2 Glasses of wine per week     Comment: 1-2 per week     Substance and Sexual Activity   Drug Use Never       Allergies  Patient has no known allergies.     Medications  No medications prior to admission.       ROS: negative except per HPI    Objective    Last Vitals  There were no vitals taken for this visit.'    Physical Examination  General: no acute distress  HEENT: normocephalic, atraumatic  Heart: warm and well perfused  Lungs: breathing comfortably on room air  Abdomen: nondistended  Extremities: moving all extremities  Neuro: awake and conversant  Psych: appropriate mood and affect    Lab Review          Lab Results   Component Value Date    WBC 8.9 12/16/2024    HGB 12.5 12/16/2024    HCT 40.6 12/16/2024    MCV 90 12/16/2024     12/16/2024       Lab Results   Component Value Date    GLUCOSE 88 12/16/2024    CALCIUM 9.2 12/16/2024     12/16/2024    K 4.0 12/16/2024    CO2 27 12/16/2024     12/16/2024    BUN 11 12/16/2024    CREATININE 0.72 12/16/2024       Assessment/Plan     Dayanara Rosales \"Carrie\" is a 59 y.o. presenting for scheduled " surgery.    Plan to proceed with laparoscopic hysterectomy, BSO, SNLD.  Surgical consent was reviewed. The risks of surgery were discussed including: bleeding (including need for blood transfusion in life-threatening situations; risks of transfusion), infection, damage to surrounding organs. The patient had the opportunity to answer questions and desired to proceed with surgery following our discussion. Both verbal and written consents were obtained.    Pearl Pringle MD  PGY-3, Obstetrics and Gynecology  Gynecologic Oncology, pager: [phone]

## 2024-12-31 NOTE — CARE PLAN
The patient's goals for the shift include      The clinical goals for the shift include  adequate pain control      Problem: Pain  Goal: Takes deep breaths with improved pain control throughout the shift  Outcome: Progressing  Goal: Turns in bed with improved pain control throughout the shift  Outcome: Progressing  Goal: Walks with improved pain control throughout the shift  Outcome: Progressing  Goal: Performs ADL's with improved pain control throughout shift  Outcome: Progressing  Goal: Participates in PT with improved pain control throughout the shift  Outcome: Progressing  Goal: Free from opioid side effects throughout the shift  Outcome: Progressing  Goal: Free from acute confusion related to pain meds throughout the shift  Outcome: Progressing     Problem: Fall/Injury  Goal: Not fall by end of shift  Outcome: Progressing  Goal: Be free from injury by end of the shift  Outcome: Progressing  Goal: Verbalize understanding of personal risk factors for fall in the hospital  Outcome: Progressing  Goal: Verbalize understanding of risk factor reduction measures to prevent injury from fall in the home  Outcome: Progressing  Goal: Use assistive devices by end of the shift  Outcome: Progressing  Goal: Pace activities to prevent fatigue by end of the shift  Outcome: Progressing

## 2024-12-31 NOTE — PERIOPERATIVE NURSING NOTE
1616 Pt arrived to PACU, alert and able to answer questions and follow commands. Pt shows no signs of distress. Will continue to monitor.     1725 Report called to nurse on [facility]. Pt stable. Transport requested.     1740 Pt in route to room on [facility] with all personal belongings. Pt stable.       Rosi Tello RN

## 2024-12-31 NOTE — ANESTHESIA PREPROCEDURE EVALUATION
"Patient: Dayanara Rosales \"Carrie\"    Procedure Information       Date/Time: 12/31/24 0920    Procedure: Hysterectomy Laparoscopy (Bilateral)    Location: [facility] OR 03 / [facility] OR    Surgeons: Shantel Motley MD MPH            Relevant Problems   Cardiac   (+) Abnormal EKG   (+) Hyperlipidemia   (+) Hypertension      Pulmonary   (+) RJ (obstructive sleep apnea)      Neuro   (+) Anxiety disorder      GI   (+) Gastroesophageal reflux disease with esophagitis without hemorrhage   (+) Gastroesophageal reflux disease without esophagitis   (+) Hiatal hernia      Musculoskeletal   (+) Chronic midline low back pain without sciatica      GYN   (+) Endometrial adenocarcinoma (Multi)       Clinical information reviewed:    Allergies  Meds               NPO Detail:  NPO/Void Status  Date of Last Liquid: 12/31/24  Time of Last Liquid: 0000  Date of Last Solid: 12/31/24  Time of Last Solid: 0000         Physical Exam    Airway  Mallampati: II  TM distance: >3 FB  Neck ROM: full  Comments: Can bite upper lip; a little decreased neck motion   Cardiovascular   Rhythm: regular  Rate: normal     Dental - normal exam     Pulmonary   Breath sounds clear to auscultation     Abdominal          Anesthesia Plan    History of general anesthesia?: yes  History of complications of general anesthesia?: no    ASA 3     general     The patient is not a current smoker.    intravenous induction   Anesthetic plan and risks discussed with patient.  Use of blood products discussed with patient who consented to blood products.    Plan discussed with resident and attending.      "

## 2024-12-31 NOTE — ANESTHESIA POSTPROCEDURE EVALUATION
"Patient: Dayanara Rosales \"Carrie\"    Procedure Summary       Date: 12/31/24 Room / Location: [facility] OR 03 / [facility]    Anesthesia Start: 1310 Anesthesia Stop: 1620    Procedure: Laparoscopic Hysterectomy, Bilateral Salpingo-Oophorectomy, Bilateral Pelvic Elsa Lymph Node Biopsy, Cystoscopy, Bilateral Tap Blocks (Bilateral) Diagnosis:       Endometrial adenocarcinoma (Multi)      (Endometrial adenocarcinoma (Multi) [C54.1])    Surgeons: Shantel Motley MD MPH Responsible Provider: Chetan Johnson MD    Anesthesia Type: general ASA Status: 3            Anesthesia Type: general    Vitals Value Taken Time   /71 12/31/24 1629   Temp 36.3 C 12/31/24 1629   Pulse 71 12/31/24 1625   Resp 12 12/31/24 1625   SpO2 94 % 12/31/24 1625   Vitals shown include unfiled device data.    Anesthesia Post Evaluation    Patient location during evaluation: PACU  Patient participation: complete - patient participated  Level of consciousness: sleepy but conscious  Pain management: adequate  Airway patency: patent  Cardiovascular status: acceptable  Respiratory status: acceptable and face mask  Hydration status: acceptable  Postoperative Nausea and Vomiting: none        No notable events documented.    "

## 2024-12-31 NOTE — OP NOTE
"Laparoscopic Hysterectomy, Bilateral Salpingo-Oophorectomy, Bilateral Pelvic [place] Lymph Node Biopsy, Cystoscopy, Bilateral Tap Blocks (B) Operative Note     Date: 2024  OR Location: [facility] OR    Name: Dayanara Rosales \"Carrie\", : 1965, Age: 59 y.o., MRN: [id number], Sex: female    Diagnosis  Pre-op Diagnosis      * Endometrial adenocarcinoma (Multi) [C54.1] Post-op Diagnosis     * Endometrial adenocarcinoma (Multi) [C54.1]     Procedures  Laparoscopic Hysterectomy, Bilateral Salpingo-Oophorectomy, Bilateral Pelvic [place] Lymph Node Biopsy, Cystoscopy, Bilateral Tap Blocks  93259 - VT LAPAROSCOPY W TOTAL HYSTERECTOMY UTERUS 250 GM/<    VT SALPINGO-OOPHORECTOMY COMPL/PRTL UNI/BI SPX [25864]  VT INTRAOPERATIVE SENTINEL LYMPH NODE ID W DYE INJECTION [[id number]]  Surgeons      * Shantel Motley - Primary    Resident/Fellow/Other Assistant:  Surgeons and Role:     * Jose Duenas MD - Resident - Assisting     * Pearl Pringle MD - Resident - Assisting    Staff:   Circulator: Kuldeep  Circulator: [place]  Scrub Person: Kuldeep  Scrub Person: Mary Wade Scrub: Lalo    Anesthesia Staff: Anesthesiologist: Chetan Johnson MD; Luciano Gilliland MD  Anesthesia Resident: Priya Frederick MD; Abby Sprague MD    Procedure Summary  Anesthesia: Anesthesia type not filed in the log.  ASA: III  Estimated Blood Loss: 25mL  Intra-op Medications:   Administrations occurring from 0920 to 1325 on 24:   Medication Name Total Dose   sterile water irrigation solution 500 mL   dexAMETHasone (Decadron) injection 4 mg/mL 4 mg   fentaNYL (Sublimaze) injection 50 mcg/mL 50 mcg   LR infusion Cannot be calculated   lidocaine (cardiac) injection 2% prefilled syringe 100 mg   midazolam PF (Versed) injection 1 mg/mL 2 mg   phenylephrine 40 mcg/mL syringe 10 mL 120 mcg   propofol (Diprivan) injection 10 mg/mL 150 mg   rocuronium (ZeMuron) 50 mg/5 mL injection 50 mg   heparin (porcine) injection 5,000 Units 5,000 Units            " "  Anesthesia Record               Intraprocedure I/O Totals       None           Specimen:   ID Type Source Tests Collected by Time   1 : pelvic washings Non-Gynecologic Cytology PELVIC WASHING CYTOLOGY CONSULTATION (NON-GYNECOLOGIC) Shantel Motley MD [facility] 12/31/2024 1353   2 : LEFT PELVIC SENTINEL LYMPH NODE Tissue SENTINEL LYMPH NODE, PELVIC SURGICAL PATHOLOGY EXAM Shantel Motley MD [facility] 12/31/2024 1415   3 : RIGHT PELVIC SENTINEL LYMPH NODE Tissue SENTINEL LYMPH NODE, PELVIC SURGICAL PATHOLOGY EXAM Shantel Motley MD [facility] 12/31/2024 1429   4 : uterus, cervix, bilateral fallopian tubes and ovaries Tissue UTERUS, CERVIX, FALLOPIAN TUBES AND OVARIES BILATERAL SURGICAL PATHOLOGY EXAM Shantel Motley MD [facility] 12/31/2024 1518                 Drains and/or Catheters:   [REMOVED] Urethral Catheter Non-latex 16 Fr. (Removed)         Findings: left sentinel node at external iliac vessels, right sentinel node between superior vesicle artery and external iliac vessels.  Right sided pelvic kidney.  Short left round ligament with uterus pulled to the left sidewall, right ovary adherent to right sidewall with attenuated right round ligament.  One cervix and one uterine fundus.  Normal appendix, liver and diaphragm surfaces.    Indications: Dayanara Rosales \"Carrie\" is an 59 y.o. female who is having surgery for Endometrial adenocarcinoma (Multi) [C54.1].     The patient was seen in the preoperative area. The risks, benefits, complications, treatment options, non-operative alternatives, expected recovery and outcomes were discussed with the patient. The possibilities of reaction to medication, pulmonary aspiration, injury to surrounding structures, bleeding, recurrent infection, the need for additional procedures, failure to diagnose a condition, and creating a complication requiring transfusion or operation were discussed with the patient. The patient concurred with the proposed plan, giving informed consent.  The site of surgery was " properly noted/marked if necessary per policy. The patient has been actively warmed in preoperative area. Preoperative antibiotics have been ordered and given within 1 hours of incision. Venous thrombosis prophylaxis have been ordered including bilateral sequential compression devices and chemical prophylaxis    Procedure Details:   After informed consent was confirmed, the patient was taken to the operating room with an IV in place. A preoperative Huddle and timeout were performed, with all OR personnel confirming correct patient and procedure. The patient was moved to the operating room table and SCDs were placed.  Heparin was administered.  General anesthetia was induced. She was then placed in the dorsal lithotomy position on the operating room table using Colton stirrups. She was prepped and draped in a normal sterile fashion for a laparoscopic hysterectomy. A surgical pause was performed. The patient's identity and surgical procedure were again confirmed by all the surgical personnel. The patient received preoperative antibiotics.    The skin superior to the supraumbilical area was infiltrated with local anesthetic. A 12mm vertical skin incision was made. This was carried down to the level of the fascia with two S retractors. The fascia was elevated with 2 kocher clamps and incised with a scalpel.  Both sides of the fascia were then tagged with 0-vicryl suture.  The peritoneum was then elevated with 2 hemostats and was entered sharply with metzenbaum scissors.  The jenaro port was then placed. CO2 was then insufflated into the abdomen.     Once this was accomplished, we then carefully inspected the abdomen and there was no evidence of injury. The patient was placed in deep Trendelenburg. We then placed three 5-mm accessory ports.  All were placed under direct visualization.  The small bowel was manipulated out of the pelvis.     A plata catheter was then placed. We then started with the vaginal portion of the  operation. A bivalve speculum was placed in the vagina, and the cervix was visualized.  Indocyanine green dye was then injected at 3 o’clock and 9 o’clock, 1cc superficially and 1cc deep at each location, for a total of 4cc.  A Fazland system was then placed as a uterine manipulator.  The speculum was then removed.      We then returned to laparoscopy and toggled back and forth from regular view to firefly mode to perform with a bilateral sentinel pelvic lymph node dissection. The uterus was placed on traction. The round ligaments were sealed and divided with the ligasure device. The broad ligament was dissected cephalad and caudad, and a bladder flap was created. This was carried to the opposite side, where the round ligament was also divided. The bladder was dissected away from the cervix; it was noted to be free of disease. The pararectal and paravesical spaces were developed bilaterally. The parametria appeared free of disease bilaterally. Lymph nodes were then visualized bilaterally and harvested off of the external iliac vessels on the left and stripped off of the lateral aspect of the superior vesicle artery on the right.  Given the abnormal anatomy of the right side involving the right pelvic kidney, care was taken to identify and preserve important structures.  Hemostasis was secure. We carefully inspected the areas and there was no evidence of bleeding. The obturator and genitofemoral nerves were  conserved.  The lymph nodes were then sent to pathology for permanent section.    We then proceeded with the hysterectomy. The infundibulopelvic ligaments were then isolated away from the ureters.  A window was created between the two and the IP ligaments were cauterized and then divided with the ligasure device.      Adhesions were noted between the sigmoid colon and pelvic sidewall on the patient's left.  These were taken down with sharp dissection.  The anterior and posterior leaves of the broad ligament were  bluntly .   At the vesicouterine fold the peritoneum was incised transversely and the bladder sharply dissected off the lower uterine segment and upper vagina.  The posterior peritoneum was gently brought down from the uterus.  The uterine vessels were skeletonized then cauterized and divided with the ligasure device followed by the cardinal ligaments and the uterosacral ligaments in a similar fashion.      We then began the colpotomy. Monopolar cutting current was used and the cervix was released circumferentially over the cervical cup.  The uterus, tubes and ovaries and cervix were delivered transvaginally.  The colpotomy was closed with 0-vloc.  Hemostasis was achieved.  The abdomen was copiously irrigated.  Surgicel powder was applied and hemostasis was noted.     Cystoscopy was performed and normal ureteral jets were noted bilaterally.  The bladder appeared normal.     We then performed bilateral TAP blocks under laparoscopic guidance using kenalog and local anesthetic.  The 5mm ports were all removed under direct visualization.  The 12mm port was removed and the fascia was then closed with another figure-of-eight suture of 0-vicryl followed by tying the two previously-tagged end of fascia together.  The port sites were all irrigated.  Hemostasis was noted.  Ports were closed using 4-0 vicryl in a subcuticular fashion, followed by steristrips.      Sponge, needle, instrument counts were correct x 2.      Jose Duenas MD      Complications:  None; patient tolerated the procedure well.    Disposition: PACU - hemodynamically stable.  Condition: stable         Attending Attestation:     Shantel Motley  Phone Number: [phone]

## 2024-12-31 NOTE — ANESTHESIA PROCEDURE NOTES
Peripheral IV  Date/Time: 12/31/2024 1:37 PM  Inserted by: Priya Frederick MD    Placement  Needle size: 18 G  Laterality: right  Location: hand  Local anesthetic: none  Site prep: chlorhexidine  Technique: anatomical landmarks  Attempts: 1

## 2025-01-01 VITALS
HEART RATE: 66 BPM | TEMPERATURE: 97.9 F | RESPIRATION RATE: 18 BRPM | BODY MASS INDEX: 33.93 KG/M2 | SYSTOLIC BLOOD PRESSURE: 135 MMHG | WEIGHT: 189.6 LBS | DIASTOLIC BLOOD PRESSURE: 77 MMHG | OXYGEN SATURATION: 92 %

## 2025-01-01 LAB
ANION GAP SERPL CALC-SCNC: 12 MMOL/L (ref 10–20)
BUN SERPL-MCNC: 15 MG/DL (ref 6–23)
CALCIUM SERPL-MCNC: 9.1 MG/DL (ref 8.6–10.6)
CHLORIDE SERPL-SCNC: 107 MMOL/L (ref 98–107)
CO2 SERPL-SCNC: 25 MMOL/L (ref 21–32)
CREAT SERPL-MCNC: 0.62 MG/DL (ref 0.5–1.05)
EGFRCR SERPLBLD CKD-EPI 2021: >90 ML/MIN/1.73M*2
ERYTHROCYTE [DISTWIDTH] IN BLOOD BY AUTOMATED COUNT: 13.2 % (ref 11.5–14.5)
GLUCOSE SERPL-MCNC: 113 MG/DL (ref 74–99)
HCT VFR BLD AUTO: 36.7 % (ref 36–46)
HGB BLD-MCNC: 11.4 G/DL (ref 12–16)
MAGNESIUM SERPL-MCNC: 2.32 MG/DL (ref 1.6–2.4)
MCH RBC QN AUTO: 27.6 PG (ref 26–34)
MCHC RBC AUTO-ENTMCNC: 31.1 G/DL (ref 32–36)
MCV RBC AUTO: 89 FL (ref 80–100)
NRBC BLD-RTO: 0 /100 WBCS (ref 0–0)
PLATELET # BLD AUTO: 271 X10*3/UL (ref 150–450)
POTASSIUM SERPL-SCNC: 4.3 MMOL/L (ref 3.5–5.3)
RBC # BLD AUTO: 4.13 X10*6/UL (ref 4–5.2)
SODIUM SERPL-SCNC: 140 MMOL/L (ref 136–145)
WBC # BLD AUTO: 14.3 X10*3/UL (ref 4.4–11.3)

## 2025-01-01 PROCEDURE — 83735 ASSAY OF MAGNESIUM: CPT

## 2025-01-01 PROCEDURE — 2500000001 HC RX 250 WO HCPCS SELF ADMINISTERED DRUGS (ALT 637 FOR MEDICARE OP)

## 2025-01-01 PROCEDURE — 85027 COMPLETE CBC AUTOMATED: CPT

## 2025-01-01 PROCEDURE — 96372 THER/PROPH/DIAG INJ SC/IM: CPT

## 2025-01-01 PROCEDURE — 36415 COLL VENOUS BLD VENIPUNCTURE: CPT

## 2025-01-01 PROCEDURE — 80048 BASIC METABOLIC PNL TOTAL CA: CPT

## 2025-01-01 PROCEDURE — 2500000004 HC RX 250 GENERAL PHARMACY W/ HCPCS (ALT 636 FOR OP/ED)

## 2025-01-01 PROCEDURE — 7100000011 HC EXTENDED STAY RECOVERY HOURLY - NURSING UNIT

## 2025-01-01 RX ORDER — TRAMADOL HYDROCHLORIDE 50 MG/1
50 TABLET ORAL EVERY 6 HOURS PRN
Qty: 15 TABLET | Refills: 0 | Status: SHIPPED | OUTPATIENT
Start: 2025-01-01

## 2025-01-01 RX ORDER — IBUPROFEN 600 MG/1
600 TABLET ORAL EVERY 6 HOURS PRN
Qty: 120 TABLET | Refills: 0 | Status: SHIPPED | OUTPATIENT
Start: 2025-01-02 | End: 2025-02-01

## 2025-01-01 RX ORDER — ACETAMINOPHEN 325 MG/1
975 TABLET ORAL EVERY 6 HOURS PRN
Qty: 360 TABLET | Refills: 0 | Status: SHIPPED | OUTPATIENT
Start: 2025-01-01 | End: 2025-01-31

## 2025-01-01 RX ADMIN — KETOROLAC TROMETHAMINE 30 MG: 30 INJECTION, SOLUTION INTRAMUSCULAR; INTRAVENOUS at 08:15

## 2025-01-01 RX ADMIN — ACETAMINOPHEN 975 MG: 325 TABLET ORAL at 02:34

## 2025-01-01 RX ADMIN — POLYETHYLENE GLYCOL 3350 17 G: 17 POWDER, FOR SOLUTION ORAL at 08:14

## 2025-01-01 RX ADMIN — PANTOPRAZOLE SODIUM 40 MG: 40 TABLET, DELAYED RELEASE ORAL at 08:15

## 2025-01-01 RX ADMIN — HEPARIN SODIUM 5000 UNITS: 5000 INJECTION, SOLUTION INTRAVENOUS; SUBCUTANEOUS at 02:34

## 2025-01-01 RX ADMIN — KETOROLAC TROMETHAMINE 30 MG: 30 INJECTION, SOLUTION INTRAMUSCULAR; INTRAVENOUS at 02:34

## 2025-01-01 RX ADMIN — ACETAMINOPHEN 975 MG: 325 TABLET ORAL at 08:15

## 2025-01-01 RX ADMIN — HEPARIN SODIUM 5000 UNITS: 5000 INJECTION, SOLUTION INTRAVENOUS; SUBCUTANEOUS at 10:26

## 2025-01-01 ASSESSMENT — COGNITIVE AND FUNCTIONAL STATUS - GENERAL
MOBILITY SCORE: 24
DAILY ACTIVITIY SCORE: 24

## 2025-01-01 ASSESSMENT — PAIN - FUNCTIONAL ASSESSMENT: PAIN_FUNCTIONAL_ASSESSMENT: 0-10

## 2025-01-01 ASSESSMENT — PAIN SCALES - GENERAL: PAINLEVEL_OUTOF10: 0 - NO PAIN

## 2025-01-01 NOTE — PROGRESS NOTES
"Dayanara Agrawal" is a 59 y.o. female on day 1 of admission presenting with Endometrial adenocarcinoma (Multi).    Subjective   Did well overnight with no acute complaints.       Objective     General: no acute distress   HEENT: normocephalic, atraumatic   Cards: RRR   Pulm: lungs CTAB   Abdomen: soft, non-distended, appropriate diffuse abdominal tenderness, port site covered in dressing with mild shadowing at umbilical port. Hypoactive bowel sounds.   : normal external anatomy   Extremities: no lesions, skin discoloration or calf tenderness   Neuro: no focal deficits        Last Recorded Vitals  Blood pressure 111/71, pulse 72, temperature 36.4 °C (97.5 °F), temperature source Temporal, resp. rate 18, weight 86 kg (189 lb 9.5 oz), SpO2 95%.  Intake/Output last 3 Shifts:  I/O last 3 completed shifts:  In: 1919 (22.3 mL/kg) [P.O.:300; I.V.:1619 (18.8 mL/kg)]  Out: 675 (7.8 mL/kg) [Urine:650 (0.2 mL/kg/hr); Blood:25]  Weight: 86 kg            Assessment/Plan   Assessment & Plan  Endometrial adenocarcinoma (Multi)    Endometrial carcinoma (Multi)    A/P  59 y.o F who is POD#1 s/p laparoscopic hysterectomy, BSO, SNLD in s/o endometrial adenocarcinoma, endometriod type FIGO grade 1      Post-operative State  - Pain controlled on current regimen  - Manrique removed in OR, voiding spontaneously   - Regular diet, advance as tolerated  - Anti-emetics and bowel regimen ordered   - LR @ 40cc/hr for maintenance, will discontinue once increased PO intake   - Strict I/Os. UOP   - Encourage early ambulation, SCD and ppx Heparin for DVT ppx. Will transition to Lovenox ppx pending AM labs  - Encourage incentive spirometry 10x/hr      Co-morbidities  HTN: Atenalol and amlodipine cont, benzaepril HELD  Depression: Sertraline cont     Dispo:  discharge today        Jose Duenas MD  Seen with Dr. Motley    "

## 2025-01-01 NOTE — DISCHARGE INSTRUCTIONS
Laparoscopic Discharge Instructions  If you have any questions about your care, please contact us at [phone].    Medications and Pain Management  Common areas of pain after laparoscopic surgery include the incision pain, pain in between your shoulder blades, the pelvis and lower back. The gas that was used to distend your abdomen for the surgery is absorbed slowly into your blood stream over the first 3-4 days after surgery. It is not passed intestinally, although, because your abdomen is distended, it may feel similar to intestinal gas. Staying active and walking is the best way to promote the absorption of this gas.  Immediately after surgery, nerve pain is the most intense, typically for the first 6 to 12 hours. As the body heals, it creates inflammation around the incisions sites adding pressure and creating soreness. After 5 days, the inflammation begins to recede and significant improvement in soreness is expected. Pulling on the incisions, especially if sudden, such as when you cough, will reactivate the nerve pain. Support your abdomen with a pillow during coughing or sneezing as this will be helpful to minimize pain. There are two types of pain pills typically used for post-operative pain management, narcotics such as tramadol and an anti-inflammatory such as Ibuprofen or Naprosyn.  Taking regular anti-inflammatory pills, such as 600mg of Ibuprofen every 6 hours for the first 5 days and then as needed is recommended. You can alternate ibuprofen with tylenol (975mg or 1000mg). The tylenol can be taken every 6 hours.  If you have problems using NSAIDs, be sure to discuss this with your doctor. The narcotic can be used on a schedule for the first 1 to 2 days but after that, only as you need it. Narcotics can cause constipation, nausea, sleepiness and headaches. You may begin your usual home medications as you were taking before unless directed by your doctor.    Incisional care  Paper tape  steri-strips are typically used for the abdominal incisions. The steri-strips will fall off on their own or can be removed after one week, this is easiest to do in the shower. You may shower and use a mild soap around the incisions and pat dry. Do not use a washcloth or scrub the incisions. Using peroxide or antiseptics is not recommended for routine care. Avoid hot and steamy showers as this may cause you to feel faint. No tub baths for six weeks following surgery. There may be discoloration or bruising around the incisions. This is normal and may take several weeks to resolve. Firmness or a nodular area under the skin near the incision may represent a collection of blood, this too will resolve on its own after a little time. If any incision develops tenderness, redness in the skin layer or has drainage please call the office.    GI Function, Nausea and Constipation  Nausea can occasionally be an issue in the first few days after surgery. It is usually caused as a side effect from the anesthesia and pain medicine, particularly narcotics. Taking the pain pills with food is a food way to proactively minimize this. Throwing up, especially after the first day, is not expected and if this happens, you should call your doctor. Feeling gassy and constipation can be a problem for the first week after surgery. Limiting the use of narcotics may be helpful. Stool softeners twice a day and a high fiber diet are safe. If needed, Miralax once daily is a good choice. If no bowel movement after 3 days, you will need to increase the Miralax until soft, regular bowel movements are passing.    Urinating  Because the bladder is disturbed by the surgery, the normal sensation may be temporarily altered. You may not be aware that your bladder is full. If the bladder is allowed to get over distended, it may make the problem worse. This is why we make sure that you are able to empty your bladder adequately before you go home. For the first  few days at home, you should make a point to empty your bladder every 3 to 4 hours. Pain with voiding, especially after the first day, is not expected and may represent a bladder infection.    Activity  For the first two days post-operatively, your soreness and recovery from anesthesia will limit your activity  Stairs are safe, just take your time  At a minimum during this time, you should walk around for 10-15 minutes every 2-3 hours. After that, in the first week, any activity except for overt exercise is safe.   During the first week you should not commit to being on your feet for more than 30 minutes at a time.   During the second week, light exercise is encouraged.  After 2 weeks from surgery, you should try to get back into regular activity.   For healing, please limit the amount of weight lifted to 8-10 pounds (a gallon of milk) for the first 2 weeks after surgery.   Driving is usually okay after the first few days. You must be able to comfortably wear a seatbelt, press the gas/brake pedals, and drive defensively. You may not drive while taking narcotic pain medicines.    When to Call the Doctor  Call for any fever above 100.4 F (If you do not feel feverish you do not have to routinely check your temperature.)  Call for severe pain not improved by medications  Call for persistent nausea, vomiting  Call for vaginal bleeding that is heavy as a period or passing blood clots larger than a quarter  Call for unusual swelling in your legs  Call if the incisions develop painful redness and discharge    In an emergency, call 911 or go to an Emergency Department at a nearby hospital      DVT Prophylaxis: Eliquis is a blood thinner used to prevent or treat blood clots.  You are taking Apixaban (Eliquis) for prevention of blood clots.  You will continue to take Apixaban (Eliquis) for 7 days after your surgery.   Let everyone involved in your care, such as a dentist or other provider, know that you are taking Apixaban  (Eliquis).  Side Effects:   - Skin bruises. If bleeding does occur, it may take a little longer than usual to stop. Let your doctor know if you develop a rash of dark red spots under the skin.  Signs of bleeding that you should call your doctor:   - Gums that bleed after brushing your teeth lasting more than 15 minutes.  - A nose bleed that lasts for more than 15 minutes.  - Bleeding from a cut that does not stop in 15 minutes.  - Urine that looks red, or urine that is dark like coffee.  - Stool (BMs) that are covered with blood, or are black.  - Vomit that is bright red or vomit that looks like coffee.

## 2025-01-01 NOTE — CARE PLAN
The patient's goals for the shift include        Problem: Pain  Goal: Takes deep breaths with improved pain control throughout the shift  Outcome: Progressing  Goal: Turns in bed with improved pain control throughout the shift  Outcome: Progressing  Goal: Walks with improved pain control throughout the shift  Outcome: Progressing  Goal: Performs ADL's with improved pain control throughout shift  Outcome: Progressing  Goal: Participates in PT with improved pain control throughout the shift  Outcome: Progressing  Goal: Free from opioid side effects throughout the shift  Outcome: Progressing  Goal: Free from acute confusion related to pain meds throughout the shift  Outcome: Progressing     Problem: Fall/Injury  Goal: Not fall by end of shift  Outcome: Progressing  Goal: Be free from injury by end of the shift  Outcome: Progressing  Goal: Verbalize understanding of personal risk factors for fall in the hospital  Outcome: Progressing  Goal: Verbalize understanding of risk factor reduction measures to prevent injury from fall in the home  Outcome: Progressing  Goal: Use assistive devices by end of the shift  Outcome: Progressing  Goal: Pace activities to prevent fatigue by end of the shift  Outcome: Progressing

## 2025-01-01 NOTE — PROGRESS NOTES
"Pharmacy Medication History Review    Dayanara Rosales \"Carrie\" is a 59 y.o. female admitted for Endometrial adenocarcinoma (Multi). Pharmacy reviewed the patient's [id number] medications and allergies for accuracy.    The list below reflects the updated PTA list.   Prior to Admission Medications   Prescriptions  Patient / Chart Reported?   amLODIPine (Norvasc) 5 mg tablet  Yes   Sig: Take 1 tablet (5 mg) by mouth once daily.   atenolol (Tenormin) 50 mg tablet  Yes   Sig: Take 1 tablet (50 mg) by mouth once daily.   benazepril (Lotensin) 20 mg tablet  Yes   Sig: Take 1 tablet (20 mg) by mouth once daily.   fexofenadine (Allegra) 60 mg tablet  Yes   Sig: Take 1 tablet (60 mg) by mouth if needed.   Patient not taking: Reported on 12/31/2024   fluticasone (Flonase) 50 mcg/actuation nasal spray  Yes   Sig: Administer 2 sprays into affected nostril(s) if needed.   Patient not taking: Reported on 12/31/2024   medroxyPROGESTERone (Provera) 10 mg tablet  Yes   Sig: Take 1 tablet (10 mg) by mouth once daily.   Take 1 tablet by mouth daily for 10 days.   Patient not taking: Reported on 12/31/2024   multivit-min/ferrous fumarate (MULTI VITAMIN ORAL)  Yes   Sig: Take by mouth. GUMMIE MV  Patient not taking: Reported on 12/31/2024   omeprazole (PriLOSEC) 20 mg DR capsule  Yes   Sig: Take 1 capsule (20 mg) by mouth once daily.   sertraline (Zoloft) 50 mg tablet  Yes   Sig: Take 1 tablet (50 mg) by mouth once daily.              The list below reflects the updated allergy list. Please review each documented allergy for additional clarification and justification.  Allergies  Reviewed by Sheridan Negron RN on 12/31/2024   No Known Allergies       Patient accepts M2B at discharge.  Local Pharmacy if  Needed:  [id number] [address]   P: [phone]     Sources used to complete the med history include:  Pt Interview (awake/alert; able to recognize/confirm meds with name prompting from sources " below; able to state times of day taken.)  Epic Dispense Report - some meds not populating, mail order(?)  Current  Ambulatory Medication List, Prior PTA List  Chart Review; Recent/Past Encounters  OARRS (no recent/current activity found)    Below are additional concerns with the patient's PTA list:  None. Pt states takes all meds in evening.    REMOVED/Marked Not taking from PTA List:  Medroxyprogesterone  Multivitamin    ----------------------------------  Mehdi Bruno PharmD, [facility]  Transitions of Care Pharmacist  Medication reconciliation complete  Please reach out via Epic Secure Chat (7p-7a) for questions,   or if no response call [phone].   Meds Ambulatory and Retail Services

## 2025-01-01 NOTE — NURSING NOTE
Reviewed AVS with patient. Patient verbalized understanding of discharge instructions and stated no further questions. IV's were removed. Patient belongings sent with patient. Patient was escorted via wheelchair by transport to [facility].

## 2025-01-01 NOTE — CARE PLAN
The patient's goals for the shift include      The clinical goals for the shift include pt will annia HDS and free fro injury throughout shift      Problem: Pain  Goal: Takes deep breaths with improved pain control throughout the shift  Outcome: Progressing  Goal: Turns in bed with improved pain control throughout the shift  Outcome: Progressing  Goal: Walks with improved pain control throughout the shift  Outcome: Progressing  Goal: Performs ADL's with improved pain control throughout shift  Outcome: Progressing  Goal: Participates in PT with improved pain control throughout the shift  Outcome: Progressing  Goal: Free from opioid side effects throughout the shift  Outcome: Progressing  Goal: Free from acute confusion related to pain meds throughout the shift  Outcome: Progressing     Problem: Fall/Injury  Goal: Not fall by end of shift  Outcome: Progressing  Goal: Be free from injury by end of the shift  Outcome: Progressing  Goal: Verbalize understanding of personal risk factors for fall in the hospital  Outcome: Progressing  Goal: Verbalize understanding of risk factor reduction measures to prevent injury from fall in the home  Outcome: Progressing  Goal: Use assistive devices by end of the shift  Outcome: Progressing  Goal: Pace activities to prevent fatigue by end of the shift  Outcome: Progressing

## 2025-01-01 NOTE — DISCHARGE SUMMARY
Discharge Summary    Admission Date: 12/31/2024  Discharge Date: 01/01/25    Discharge Diagnosis  Endometrial adenocarcinoma (Multi)    Hospital Course    Patient was admitted on 12/31 for scheduled surgery. She underwent a laparoscopic hysterectomy, BSO, SNLD. Her procedure was uncomplicated, see the operative report for full details. By POD 1 she was meeting all postoperative milestones including: tolerating PO diet and medications, voiding, ambulating. Her pain was well controlled with PO pain medications. She was appropriate for discharge home and will follow up as an outpatient with Dr. Motley. She was discharged home with 7 day course of Eliquis for DVT prophylaxis.                     Last Vitals:  Temp Pulse Resp BP MAP Pulse Ox   36.6 °C (97.9 °F) 66 18 135/77 84 92 %     Discharge Meds     Your medication list        START taking these medications        Instructions Last Dose Given Next Dose Due   acetaminophen 325 mg tablet  Commonly known as: Tylenol      Take 3 tablets (975 mg) by mouth every 6 hours if needed for mild pain (1 - 3).       apixaban 2.5 mg tablet  Commonly known as: Eliquis      Take 1 tablet (2.5 mg) by mouth 2 times a day for 7 days.       ibuprofen 600 mg tablet  Start taking on: January 2, 2025      Take 1 tablet (600 mg) by mouth every 6 hours if needed for mild pain (1 - 3). Do not fill before January 2, 2025.       traMADol 50 mg tablet  Commonly known as: Ultram      Take 1 tablet (50 mg) by mouth every 6 hours if needed for severe pain (7 - 10).              CONTINUE taking these medications        Instructions Last Dose Given Next Dose Due   amLODIPine 5 mg tablet  Commonly known as: Norvasc      Take 1 tablet (5 mg) by mouth once daily.       atenolol 50 mg tablet  Commonly known as: Tenormin      Take 1 tablet (50 mg) by mouth once daily.       benazepril 20 mg tablet  Commonly known as: Lotensin      Take 1 tablet (20 mg) by mouth once daily.       MULTI VITAMIN ORAL            omeprazole 20 mg DR capsule  Commonly known as: PriLOSEC      Take 1 capsule (20 mg) by mouth once daily.       sertraline 50 mg tablet  Commonly known as: Zoloft      Take 1 tablet (50 mg) by mouth once daily.              STOP taking these medications      medroxyPROGESTERone 10 mg tablet  Commonly known as: Provera               ASK your doctor about these medications        Instructions Last Dose Given Next Dose Due   fexofenadine 60 mg tablet  Commonly known as: Allegra           fluticasone 50 mcg/actuation nasal spray  Commonly known as: Flonase                     Where to Get Your Medications        These medications were sent to [id number] - [place], [place] - [address]  [address] [facility] [address]      Phone: [phone]   acetaminophen 325 mg tablet  apixaban 2.5 mg tablet  ibuprofen 600 mg tablet  traMADol 50 mg tablet          Test Results Pending At Discharge  Pending Labs       Order Current Status    Cytology Consultation (Non-Gynecologic) Collected (12/31/24 1353)    Surgical Pathology Exam Collected (12/31/24 1415)            Outpatient Follow-Up  Future Appointments   Date Time Provider Department Center   1/23/2025 11:20 AM Shantel Motley MD MPH [facility]   2/12/2025 10:45 [id number]   6/9/2025  7:30 AM HAILEE Pena-CNP [id number] [facility]         Seen and discussed with Dr. Motley.  Mami Kohler MD

## 2025-01-01 NOTE — SIGNIFICANT EVENT
Postop check      S: Pt is doing okay. Pain is controlled on pain regimen. Tolerating PO intake without nausea or emesis. Voiding and ambulating. Minimal vaginal bleeding. Hasn't passed flatus or had BM yet.     O:     ../80 (BP Location: Right arm, Patient Position: Lying)   Pulse 82   Temp 36.8 °C (98.3 °F) (Temporal)   Resp 18   Wt 86 kg (189 lb 9.5 oz)   SpO2 95%   BMI 33.93 kg/m²     General: no acute distress   HEENT: normocephalic, atraumatic   Cards: RRR   Pulm: lungs CTAB   Abdomen: soft, non-distended, appropriate diffuse abdominal tenderness, port site covered in dressing with mild shadowing at umbilical port. Hypoactive bowel sounds.   : normal external anatomy   Extremities: no lesions, skin discoloration or calf tenderness   Neuro: no focal deficits     A/P  59 y.o F who is POD#0 s/p laparoscopic hysterectomy, BSO, SNLD in s/o endometrial adenocarcinoma, endometriod type FIGO grade 1     Post-operative State  - Pain controlled on current regimen  - Manrique removed in OR, voiding spontaneously   - Regular diet, advance as tolerated  - Anti-emetics and bowel regimen ordered   - LR @ 40cc/hr for maintenance, will discontinue once increased PO intake   - Strict I/Os. UOP   - Encourage early ambulation, SCD and ppx Heparin for DVT ppx. Will transition to Lovenox ppx pending AM labs  - Encourage incentive spirometry 10x/hr      Co-morbidities  HTN: Atenalol and amlodipine cont, benzaepril HELD  Depression: Sertraline cont    Dispo: inpatient management until meeting postoperative milestones, anticipate discharge tomorrow     To be seen and d/w AM Gyn Onc team,   .Geneva Gold MD, PGY-3   [place] [phone]

## 2025-01-02 LAB
LABORATORY COMMENT REPORT: NORMAL
LABORATORY COMMENT REPORT: NORMAL
PATH REPORT.FINAL DX SPEC: NORMAL
PATH REPORT.GROSS SPEC: NORMAL
PATH REPORT.RELEVANT HX SPEC: NORMAL
PATH REPORT.TOTAL CANCER: NORMAL

## 2025-01-09 ENCOUNTER — TELEPHONE (OUTPATIENT)
Dept: GYNECOLOGIC ONCOLOGY | Facility: HOSPITAL | Age: 60
End: 2025-01-09
Payer: COMMERCIAL

## 2025-01-09 ENCOUNTER — LAB (OUTPATIENT)
Dept: LAB | Facility: LAB | Age: 60
End: 2025-01-09
Payer: COMMERCIAL

## 2025-01-09 DIAGNOSIS — C54.1 ENDOMETRIAL ADENOCARCINOMA (MULTI): ICD-10-CM

## 2025-01-09 DIAGNOSIS — R30.0 DYSURIA: ICD-10-CM

## 2025-01-09 LAB
APPEARANCE UR: ABNORMAL
BILIRUB UR STRIP.AUTO-MCNC: NEGATIVE MG/DL
COLOR UR: YELLOW
ERYTHROCYTE [DISTWIDTH] IN BLOOD BY AUTOMATED COUNT: 13.7 % (ref 11.5–14.5)
GLUCOSE UR STRIP.AUTO-MCNC: NEGATIVE MG/DL
HCT VFR BLD AUTO: 36.5 % (ref 36–46)
HGB BLD-MCNC: 11.3 G/DL (ref 12–16)
HYALINE CASTS #/AREA URNS AUTO: ABNORMAL /LPF
KETONES UR STRIP.AUTO-MCNC: ABNORMAL MG/DL
LEUKOCYTE ESTERASE UR QL STRIP.AUTO: ABNORMAL
MCH RBC QN AUTO: 28.2 PG (ref 26–34)
MCHC RBC AUTO-ENTMCNC: 31 G/DL (ref 32–36)
MCV RBC AUTO: 91 FL (ref 80–100)
MUCOUS THREADS #/AREA URNS AUTO: ABNORMAL /LPF
NITRITE UR QL STRIP.AUTO: NEGATIVE
NRBC BLD-RTO: 0 /100 WBCS (ref 0–0)
PH UR STRIP.AUTO: 6 [PH]
PLATELET # BLD AUTO: 281 X10*3/UL (ref 150–450)
PROT UR STRIP.AUTO-MCNC: ABNORMAL MG/DL
RBC # BLD AUTO: 4.01 X10*6/UL (ref 4–5.2)
RBC # UR STRIP.AUTO: ABNORMAL /UL
RBC #/AREA URNS AUTO: ABNORMAL /HPF
SP GR UR STRIP.AUTO: 1.02
SQUAMOUS #/AREA URNS AUTO: ABNORMAL /HPF
UROBILINOGEN UR STRIP.AUTO-MCNC: ABNORMAL MG/DL
WBC # BLD AUTO: 15.8 X10*3/UL (ref 4.4–11.3)
WBC #/AREA URNS AUTO: ABNORMAL /HPF

## 2025-01-09 PROCEDURE — 81001 URINALYSIS AUTO W/SCOPE: CPT

## 2025-01-09 PROCEDURE — 87086 URINE CULTURE/COLONY COUNT: CPT

## 2025-01-09 PROCEDURE — 87186 SC STD MICRODIL/AGAR DIL: CPT

## 2025-01-09 PROCEDURE — 85027 COMPLETE CBC AUTOMATED: CPT

## 2025-01-09 NOTE — TELEPHONE ENCOUNTER
Phoned patient in follow up to [employer] message sent by patient reporting fever on 1/8/25.  Message left on patient voice mail requesting a return phone call.      Patient returned nurse call.     Patient reports temperature of 101.4 - 102 that started yesterday.   + chills.   Today temperature is .  Patient alternating Tylenol and Ibuprofen.   Abdominal incision withou redness/drainage.  Denies worsening abdominal/pelvic pain, n/v.  Denies vaginal drainage/bleeding, denies urinary symptoms.    + BM.  Patient denies sick contact although visited  in the hosptial yesterday but wore a mask.     S/p laparoscopic hysterectomy BSO, SLND on 12/31/24.   Message sent to Dr. Motley to update.   Dr. Motley recommends CBC and UA/reflex culture and sooner office visit with continued fever.    1148  Phoned patient to notify that Dr. Motley recommends CBC and UA with reflex culture.  Notified patient that she can go to any  lab to have labs collected.   Patient states temperature is currently 98.4.   Notified patient that if fever continues Dr. Motley recommends sooner office visit.     Office to follow up results.

## 2025-01-10 ENCOUNTER — TELEPHONE (OUTPATIENT)
Dept: GYNECOLOGIC ONCOLOGY | Facility: HOSPITAL | Age: 60
End: 2025-01-10
Payer: COMMERCIAL

## 2025-01-10 DIAGNOSIS — N30.00 ACUTE CYSTITIS WITHOUT HEMATURIA: Primary | ICD-10-CM

## 2025-01-10 RX ORDER — SULFAMETHOXAZOLE AND TRIMETHOPRIM 800; 160 MG/1; MG/1
1 TABLET ORAL 2 TIMES DAILY
Qty: 10 TABLET | Refills: 0 | Status: SHIPPED | OUTPATIENT
Start: 2025-01-10 | End: 2025-01-10

## 2025-01-10 RX ORDER — SULFAMETHOXAZOLE AND TRIMETHOPRIM 800; 160 MG/1; MG/1
1 TABLET ORAL 2 TIMES DAILY
Qty: 10 TABLET | Refills: 0 | Status: SHIPPED | OUTPATIENT
Start: 2025-01-10 | End: 2025-01-15

## 2025-01-10 NOTE — TELEPHONE ENCOUNTER
Phoned patient to notify that CBC shows elevated WBC and UA suspicious for UTI.   Notified patient that Dr. Soto recommends antibiotic and has sent prescription for Bactrim to [facility] pharmacy.   Instructed patient to start ATB today.    Patient reports temperature of 101 yesterday and this morning temperature was 98.6.   Patient states she has been alternating Tylenol and Ibuprofen every 6 hours.    Patient states she is feeling better as compared to yesterday.     Notified patient that Dr. Motley recommends sooner office visit if fever continues.     Office will follow up on urine culture results once available.   Patient verbalized her understanding of information given.

## 2025-01-12 LAB — BACTERIA UR CULT: ABNORMAL

## 2025-01-13 NOTE — TUMOR BOARD NOTE
Gynecologic Oncology Tumor Board 2025         Dayanara SOHAN Rosales  59 y.o.    1965  MRN [id number]    Provider: Shantel Motley MD  Disease Site: {TB Disease Site:01479}  Pathology: ***  Grade: ***  Stage: ***    We reviewed previous medical and familial history, history of present illness, and recent lab results along with all available histopathologic and imaging studies. The tumor board considered available treatment options and made the following recommendations.    Recommendations:     ***     Clinical Trial Consideration: {Gyn Onc Clinical Trials:72649}    National site-specific guidelines were discussed with respect to the case. It is recognized that there may be additional factors not discussed in the Review Board discussion that can influence management decisions, and alternative management options which fall within the standard of care. Accordingly the final treatment disposition will be determined by the patient, in the context of an informed discussion with their providers. The actual prescribed management or treatment plan may or may not be consistent with the Review Board recommendations.

## 2025-01-17 ENCOUNTER — APPOINTMENT (OUTPATIENT)
Dept: HEMATOLOGY/ONCOLOGY | Facility: HOSPITAL | Age: 60
End: 2025-01-17
Payer: COMMERCIAL

## 2025-01-17 DIAGNOSIS — C54.1 ENDOMETRIAL CANCER (MULTI): Primary | ICD-10-CM

## 2025-01-23 ENCOUNTER — PREP FOR PROCEDURE (OUTPATIENT)
Dept: OPERATING ROOM | Facility: HOSPITAL | Age: 60
End: 2025-01-23

## 2025-01-23 ENCOUNTER — ANESTHESIA EVENT (OUTPATIENT)
Dept: OPERATING ROOM | Facility: HOSPITAL | Age: 60
End: 2025-01-23
Payer: COMMERCIAL

## 2025-01-23 ENCOUNTER — OFFICE VISIT (OUTPATIENT)
Dept: GYNECOLOGIC ONCOLOGY | Facility: HOSPITAL | Age: 60
End: 2025-01-23
Payer: COMMERCIAL

## 2025-01-23 VITALS
BODY MASS INDEX: 33.83 KG/M2 | WEIGHT: 189 LBS | SYSTOLIC BLOOD PRESSURE: 131 MMHG | DIASTOLIC BLOOD PRESSURE: 88 MMHG | TEMPERATURE: 97.5 F | HEART RATE: 64 BPM

## 2025-01-23 DIAGNOSIS — Z90.721 S/P HYSTERECTOMY WITH OOPHORECTOMY: ICD-10-CM

## 2025-01-23 DIAGNOSIS — T81.328A DEHISCENCE OF VAGINAL CUFF, INITIAL ENCOUNTER: Primary | ICD-10-CM

## 2025-01-23 DIAGNOSIS — C54.1 ENDOMETRIAL ADENOCARCINOMA (MULTI): ICD-10-CM

## 2025-01-23 DIAGNOSIS — Z90.710 S/P HYSTERECTOMY WITH OOPHORECTOMY: ICD-10-CM

## 2025-01-23 DIAGNOSIS — N39.0 URINARY TRACT INFECTION WITHOUT HEMATURIA, SITE UNSPECIFIED: ICD-10-CM

## 2025-01-23 PROCEDURE — 99211 OFF/OP EST MAY X REQ PHY/QHP: CPT | Performed by: STUDENT IN AN ORGANIZED HEALTH CARE EDUCATION/TRAINING PROGRAM

## 2025-01-23 PROCEDURE — 3075F SYST BP GE 130 - 139MM HG: CPT | Performed by: STUDENT IN AN ORGANIZED HEALTH CARE EDUCATION/TRAINING PROGRAM

## 2025-01-23 PROCEDURE — 3079F DIAST BP 80-89 MM HG: CPT | Performed by: STUDENT IN AN ORGANIZED HEALTH CARE EDUCATION/TRAINING PROGRAM

## 2025-01-23 ASSESSMENT — PAIN SCALES - GENERAL: PAINLEVEL_OUTOF10: 0-NO PAIN

## 2025-01-23 NOTE — PROGRESS NOTES
Patient ID: Carrie Rosales is a 59 y.o. female.  Referring Physician: No referring provider defined for this encounter.  Primary Care Provider: HAILEE Pena-CNP    Subjective    Here for POV s/p Laparoscopic Hysterectomy, Bilateral Salpingo-Oophorectomy, Bilateral Pelvic [place] Lymph Node Biopsy, Cystoscopy, Bilateral Tap Blocks on 12/31/24. Pathology is pending. Fever POD#7. Labs with leukocytosis and UA was suspicious for UTI. Ucx resulted K pneumoniae/variicola. She was given bactrim with symptomatic improvement. Does report vaginal bleeding. More than spotting, but not heavy. Denies vaginal discharge. Voiding without difficulty. Regular normal bowel movements. No straining. Has been compliant with pelvic rest. Not lifting more than 10 lb.     Objective    BSA: 1.95 meters squared  /88 (BP Location: Right arm, Patient Position: Sitting, BP Cuff Size: Adult)   Pulse 64   Temp 36.4 °C (97.5 °F)   Wt 85.7 kg (189 lb)   BMI 33.83 kg/m²      Physical Exam  Vitals and nursing note reviewed. Exam conducted with a chaperone present.   Constitutional:       Appearance: Normal appearance. She is normal weight.   HENT:      Head: Normocephalic and atraumatic.      Mouth/Throat:      Mouth: Mucous membranes are moist.   Eyes:      Extraocular Movements: Extraocular movements intact.      Conjunctiva/sclera: Conjunctivae normal.      Pupils: Pupils are equal, round, and reactive to light.   Cardiovascular:      Rate and Rhythm: Normal rate.   Abdominal:      General: Bowel sounds are normal.      Palpations: Abdomen is soft. There is no mass.      Tenderness: There is no guarding or rebound.      Hernia: No hernia is present.      Comments: Laparoscopic incisions C/D/I without redness, drainage or erythema   Genitourinary:     General: Normal vulva.      Vagina: Normal. No vaginal discharge, erythema or lesions.      Comments: Surgically absent uterus and cervix  2 cm L lateral aspect of the cuff  with dehiscence, induration cephalad, firm without defect intra-abdominal cavity. Stitch R aspect of the cuff intact. Fibrinous tissue at the cuff with ooze, slight noted from area of induration.  Musculoskeletal:         General: Normal range of motion.      Cervical back: Normal range of motion and neck supple.   Skin:     General: Skin is warm.      Findings: No erythema or rash.   Neurological:      General: No focal deficit present.      Mental Status: She is alert and oriented to person, place, and time. Mental status is at baseline.   Psychiatric:         Mood and Affect: Mood normal.         Behavior: Behavior normal.         Performance Status:  Asymptomatic    Assessment/Plan     Oncology History Overview Note   9/19/24 Pelvic US uterus 8.7 x 3.9 x 4.2 cm  EML 1.9 cm  10/10/24 EMBx Endometrial adenocarcinoma, endometrioid type, FIGO grade 1 MMRp p53wt     Endometrial adenocarcinoma (Multi)   10/17/2024 Initial Diagnosis    Endometrial adenocarcinoma (Multi)          Diagnoses and all orders for this visit:  Dehiscence of vaginal cuff, initial encounter  Endometrial adenocarcinoma (Multi)  Urinary tract infection without hematuria, site unspecified  S/P hysterectomy with oophorectomy    # Vaginal cuff dehiscence without evisceration   - Discussed exam findings, no e/o infection   - Discussed options for pelvic rest with delayed healing and consideration for vaginal estrogen versus primary closure in the OR   - Plan for EUA, vaginal cuff closure in the OR, consent to be signed DOS  - PAT not needed  - Same day discharge  - Strict pelvic rest, no straining with bowel movements    # Post op  - Recovering well  - Reviewed ongoing restrictions (no lifting more than 10-15lb, no soaking)  - Abdominal incision healing well.      # Endometrial cancer  - Pathology pending, will follow up    # h/o UTI   - s/p Bactrim with symptom improvement     RTC 2 weeks s/p cuff repair for exam and pathology review    Shantel RODAS  MD [facility]

## 2025-01-23 NOTE — HOSPITAL COURSE
"Carrie Rosales is a 59 y.o. female with endometrial adenocarcinoma, endometrioid type FIGO grade 1 who is s/p  Laparoscopic Hysterectomy, Bilateral Salpingo-Oophorectomy, Bilateral Pelvic [place] Lymph Node Biopsy on 24 who presents for exam under anesthesia and repair of vaginal cuff dehiscence.       Tumor History:  Imaging: TVUS   Abnormally thickened endometrium. Tissue sampling is recommended to  assess for endometrial hyperplasia versus carcinoma. Yellow Alert.      Suspected hepatic steatosis. Possible right-sided hydronephrosis  versus parapelvic cysts. Recommend further assessment with right  upper quadrant ultrasound.     Pathology:  A. Endometrium, biopsy:   -Endometrial adenocarcinoma, endometrioid type, FIGO grade 1.  See note.     Tumor Markers:  No results found for: \"\", \"\", \"CEA\"     Obstetrical History                    OB History    Para Term  AB Living   2 2 2         SAB IAB Ectopic Multiple Live Births                         # Outcome Date GA Lbr Andreas/2nd Weight Sex Type Anes PTL Lv   2 Term                     1 Term                           Past Medical History       Past Medical History:   Diagnosis Date    Arthritis      Depression, unspecified      Dorsopathy, unspecified       Back problem    Endometrial cancer (Multi)       seen by Dr. Shantel Motley on 10/24/24    Essential (primary) hypertension      Gastro-esophageal reflux disease without esophagitis      Headache, unspecified      Hepatic steatosis       Noted 2024 on US Pelvis--Suspected hepatic steatosis    Hiatal hernia      IBS (irritable bowel syndrome)      MORGAN (nonalcoholic steatohepatitis)      RJ (obstructive sleep apnea)       No CPAP    Palpitations      Panic disorder (episodic paroxysmal anxiety)      Pelvic kidney      Postmenopausal bleeding      Vision loss       Wears corrective lens    Vitamin D deficiency, unspecified           Past Surgical History   Tonsillectomy " 2/25/14  Cholecystectomy laparoscopic  Colonoscopy  Endometrial biopsy  Laparoscopic Hysterectomy, Bilateral Salpingo-Oophorectomy, Bilateral Pelvic [place] Lymph Node Biopsy 12/31/2024       Family History:         Family History   Problem Relation Name Age of Onset    Hyperlipidemia Mother R Román      Hypertension Mother R Román      Macular degeneration Mother R Román      Breast cancer Mother R Román 51    Pancreatic cancer Mother R Román 91    Vision loss Mother R Román      Cancer Mother R Román      Hyperlipidemia Father M Román      Hypertension Father M Román      Skin cancer Father M Román      Other (Chronic lymphocytic leukemia) Father M Román 80 - 89    Pancreatic cancer Brother J Román 64    Cancer Brother J Román           Social History  Social History            Tobacco Use    Smoking status: Never       Passive exposure: Never    Smokeless tobacco: Never   Substance Use Topics    Alcohol use: Not Currently       Alcohol/week: 1.0 - 2.0 standard drink of alcohol       Types: 1 - 2 Glasses of wine per week       Comment: 1-2 per week          Substance and Sexual Activity   Drug Use Never         Allergies  Patient has no known allergies.      Medications  No medications prior to admission.

## 2025-01-24 ENCOUNTER — TUMOR BOARD CONFERENCE (OUTPATIENT)
Dept: HEMATOLOGY/ONCOLOGY | Facility: HOSPITAL | Age: 60
End: 2025-01-24
Payer: COMMERCIAL

## 2025-01-24 ENCOUNTER — ANESTHESIA (OUTPATIENT)
Dept: OPERATING ROOM | Facility: HOSPITAL | Age: 60
End: 2025-01-24
Payer: COMMERCIAL

## 2025-01-24 ENCOUNTER — HOSPITAL ENCOUNTER (OUTPATIENT)
Facility: HOSPITAL | Age: 60
Setting detail: OUTPATIENT SURGERY
Discharge: HOME | End: 2025-01-24
Attending: STUDENT IN AN ORGANIZED HEALTH CARE EDUCATION/TRAINING PROGRAM | Admitting: STUDENT IN AN ORGANIZED HEALTH CARE EDUCATION/TRAINING PROGRAM
Payer: COMMERCIAL

## 2025-01-24 VITALS
RESPIRATION RATE: 16 BRPM | SYSTOLIC BLOOD PRESSURE: 136 MMHG | OXYGEN SATURATION: 97 % | DIASTOLIC BLOOD PRESSURE: 68 MMHG | TEMPERATURE: 97.3 F | WEIGHT: 190.48 LBS | HEART RATE: 59 BPM | BODY MASS INDEX: 34.09 KG/M2

## 2025-01-24 DIAGNOSIS — Z98.890 POSTOPERATIVE STATE: ICD-10-CM

## 2025-01-24 DIAGNOSIS — T81.328A DEHISCENCE OF VAGINAL CUFF, INITIAL ENCOUNTER: ICD-10-CM

## 2025-01-24 DIAGNOSIS — T81.328D DEHISCENCE OF VAGINAL CUFF, SUBSEQUENT ENCOUNTER: Primary | ICD-10-CM

## 2025-01-24 PROCEDURE — 57200 REPAIR OF VAGINA: CPT | Performed by: STUDENT IN AN ORGANIZED HEALTH CARE EDUCATION/TRAINING PROGRAM

## 2025-01-24 PROCEDURE — 3700000002 HC GENERAL ANESTHESIA TIME - EACH INCREMENTAL 1 MINUTE: Performed by: STUDENT IN AN ORGANIZED HEALTH CARE EDUCATION/TRAINING PROGRAM

## 2025-01-24 PROCEDURE — 3700000001 HC GENERAL ANESTHESIA TIME - INITIAL BASE CHARGE: Performed by: STUDENT IN AN ORGANIZED HEALTH CARE EDUCATION/TRAINING PROGRAM

## 2025-01-24 PROCEDURE — 7100000009 HC PHASE TWO TIME - INITIAL BASE CHARGE: Performed by: STUDENT IN AN ORGANIZED HEALTH CARE EDUCATION/TRAINING PROGRAM

## 2025-01-24 PROCEDURE — 2500000005 HC RX 250 GENERAL PHARMACY W/O HCPCS: Performed by: STUDENT IN AN ORGANIZED HEALTH CARE EDUCATION/TRAINING PROGRAM

## 2025-01-24 PROCEDURE — 7100000010 HC PHASE TWO TIME - EACH INCREMENTAL 1 MINUTE: Performed by: STUDENT IN AN ORGANIZED HEALTH CARE EDUCATION/TRAINING PROGRAM

## 2025-01-24 PROCEDURE — 3600000008 HC OR TIME - EACH INCREMENTAL 1 MINUTE - PROCEDURE LEVEL THREE: Performed by: STUDENT IN AN ORGANIZED HEALTH CARE EDUCATION/TRAINING PROGRAM

## 2025-01-24 PROCEDURE — 3600000003 HC OR TIME - INITIAL BASE CHARGE - PROCEDURE LEVEL THREE: Performed by: STUDENT IN AN ORGANIZED HEALTH CARE EDUCATION/TRAINING PROGRAM

## 2025-01-24 PROCEDURE — 2500000004 HC RX 250 GENERAL PHARMACY W/ HCPCS (ALT 636 FOR OP/ED)

## 2025-01-24 RX ORDER — CEFAZOLIN 1 G/1
INJECTION, POWDER, FOR SOLUTION INTRAVENOUS AS NEEDED
Status: DISCONTINUED | OUTPATIENT
Start: 2025-01-24 | End: 2025-01-24

## 2025-01-24 RX ORDER — ESTRADIOL 10 UG/1
10 INSERT VAGINAL NIGHTLY
Qty: 30 TABLET | Refills: 0 | Status: SHIPPED | OUTPATIENT
Start: 2025-01-24 | End: 2025-02-23

## 2025-01-24 RX ORDER — FENTANYL CITRATE 50 UG/ML
25 INJECTION, SOLUTION INTRAMUSCULAR; INTRAVENOUS EVERY 5 MIN PRN
Status: DISCONTINUED | OUTPATIENT
Start: 2025-01-24 | End: 2025-01-24 | Stop reason: HOSPADM

## 2025-01-24 RX ORDER — OXYCODONE HYDROCHLORIDE 5 MG/1
10 TABLET ORAL EVERY 4 HOURS PRN
Status: DISCONTINUED | OUTPATIENT
Start: 2025-01-24 | End: 2025-01-24 | Stop reason: HOSPADM

## 2025-01-24 RX ORDER — LIDOCAINE HYDROCHLORIDE 10 MG/ML
0.1 INJECTION, SOLUTION EPIDURAL; INFILTRATION; INTRACAUDAL; PERINEURAL ONCE
Status: DISCONTINUED | OUTPATIENT
Start: 2025-01-24 | End: 2025-01-24 | Stop reason: HOSPADM

## 2025-01-24 RX ORDER — OXYCODONE HYDROCHLORIDE 5 MG/1
5 TABLET ORAL EVERY 4 HOURS PRN
Status: DISCONTINUED | OUTPATIENT
Start: 2025-01-24 | End: 2025-01-24 | Stop reason: HOSPADM

## 2025-01-24 RX ORDER — SODIUM CHLORIDE, SODIUM LACTATE, POTASSIUM CHLORIDE, CALCIUM CHLORIDE 600; 310; 30; 20 MG/100ML; MG/100ML; MG/100ML; MG/100ML
INJECTION, SOLUTION INTRAVENOUS CONTINUOUS PRN
Status: DISCONTINUED | OUTPATIENT
Start: 2025-01-24 | End: 2025-01-24

## 2025-01-24 RX ORDER — IBUPROFEN 600 MG/1
600 TABLET ORAL EVERY 6 HOURS PRN
Qty: 60 TABLET | Refills: 0 | Status: SHIPPED | OUTPATIENT
Start: 2025-01-24

## 2025-01-24 RX ORDER — METOCLOPRAMIDE HYDROCHLORIDE 5 MG/ML
10 INJECTION INTRAMUSCULAR; INTRAVENOUS ONCE AS NEEDED
Status: DISCONTINUED | OUTPATIENT
Start: 2025-01-24 | End: 2025-01-24 | Stop reason: HOSPADM

## 2025-01-24 RX ORDER — LIDOCAINE HCL/PF 100 MG/5ML
SYRINGE (ML) INTRAVENOUS AS NEEDED
Status: DISCONTINUED | OUTPATIENT
Start: 2025-01-24 | End: 2025-01-24

## 2025-01-24 RX ORDER — ACETAMINOPHEN 325 MG/1
975 TABLET ORAL EVERY 6 HOURS PRN
Qty: 120 TABLET | Refills: 0 | Status: SHIPPED | OUTPATIENT
Start: 2025-01-24

## 2025-01-24 RX ORDER — ONDANSETRON HYDROCHLORIDE 2 MG/ML
4 INJECTION, SOLUTION INTRAVENOUS ONCE AS NEEDED
Status: DISCONTINUED | OUTPATIENT
Start: 2025-01-24 | End: 2025-01-24 | Stop reason: HOSPADM

## 2025-01-24 RX ORDER — PROPOFOL 10 MG/ML
INJECTION, EMULSION INTRAVENOUS AS NEEDED
Status: DISCONTINUED | OUTPATIENT
Start: 2025-01-24 | End: 2025-01-24

## 2025-01-24 RX ORDER — METHOCARBAMOL 100 MG/ML
1000 INJECTION, SOLUTION INTRAMUSCULAR; INTRAVENOUS ONCE
Status: DISCONTINUED | OUTPATIENT
Start: 2025-01-24 | End: 2025-01-24 | Stop reason: HOSPADM

## 2025-01-24 RX ORDER — SODIUM CHLORIDE, SODIUM LACTATE, POTASSIUM CHLORIDE, CALCIUM CHLORIDE 600; 310; 30; 20 MG/100ML; MG/100ML; MG/100ML; MG/100ML
100 INJECTION, SOLUTION INTRAVENOUS CONTINUOUS
Status: DISCONTINUED | OUTPATIENT
Start: 2025-01-24 | End: 2025-01-24 | Stop reason: HOSPADM

## 2025-01-24 RX ORDER — FENTANYL CITRATE 50 UG/ML
12.5 INJECTION, SOLUTION INTRAMUSCULAR; INTRAVENOUS EVERY 5 MIN PRN
Status: DISCONTINUED | OUTPATIENT
Start: 2025-01-24 | End: 2025-01-24 | Stop reason: HOSPADM

## 2025-01-24 RX ORDER — FENTANYL CITRATE 50 UG/ML
INJECTION, SOLUTION INTRAMUSCULAR; INTRAVENOUS AS NEEDED
Status: DISCONTINUED | OUTPATIENT
Start: 2025-01-24 | End: 2025-01-24

## 2025-01-24 RX ORDER — MIDAZOLAM HYDROCHLORIDE 1 MG/ML
INJECTION INTRAMUSCULAR; INTRAVENOUS AS NEEDED
Status: DISCONTINUED | OUTPATIENT
Start: 2025-01-24 | End: 2025-01-24

## 2025-01-24 RX ORDER — SODIUM CHLORIDE 0.9 G/100ML
INJECTION, SOLUTION IRRIGATION AS NEEDED
Status: DISCONTINUED | OUTPATIENT
Start: 2025-01-24 | End: 2025-01-24 | Stop reason: HOSPADM

## 2025-01-24 RX ADMIN — PROPOFOL 10 MG: 10 INJECTION, EMULSION INTRAVENOUS at 07:39

## 2025-01-24 RX ADMIN — PROPOFOL 20 MG: 10 INJECTION, EMULSION INTRAVENOUS at 08:01

## 2025-01-24 RX ADMIN — SODIUM CHLORIDE, POTASSIUM CHLORIDE, SODIUM LACTATE AND CALCIUM CHLORIDE: 600; 310; 30; 20 INJECTION, SOLUTION INTRAVENOUS at 06:59

## 2025-01-24 RX ADMIN — PROPOFOL 10 MG: 10 INJECTION, EMULSION INTRAVENOUS at 08:02

## 2025-01-24 RX ADMIN — FENTANYL CITRATE 25 MCG: 50 INJECTION, SOLUTION INTRAMUSCULAR; INTRAVENOUS at 07:58

## 2025-01-24 RX ADMIN — PROPOFOL 10 MG: 10 INJECTION, EMULSION INTRAVENOUS at 07:51

## 2025-01-24 RX ADMIN — PROPOFOL 10 MG: 10 INJECTION, EMULSION INTRAVENOUS at 07:36

## 2025-01-24 RX ADMIN — FENTANYL CITRATE 50 MCG: 50 INJECTION, SOLUTION INTRAMUSCULAR; INTRAVENOUS at 07:28

## 2025-01-24 RX ADMIN — PROPOFOL 20 MG: 10 INJECTION, EMULSION INTRAVENOUS at 07:28

## 2025-01-24 RX ADMIN — PROPOFOL 20 MG: 10 INJECTION, EMULSION INTRAVENOUS at 07:54

## 2025-01-24 RX ADMIN — MIDAZOLAM HYDROCHLORIDE 2 MG: 1 INJECTION, SOLUTION INTRAMUSCULAR; INTRAVENOUS at 07:15

## 2025-01-24 RX ADMIN — PROPOFOL 15 MG: 10 INJECTION, EMULSION INTRAVENOUS at 07:44

## 2025-01-24 RX ADMIN — PROPOFOL 20 MG: 10 INJECTION, EMULSION INTRAVENOUS at 07:58

## 2025-01-24 RX ADMIN — PROPOFOL 15 MG: 10 INJECTION, EMULSION INTRAVENOUS at 07:45

## 2025-01-24 RX ADMIN — PROPOFOL 10 MG: 10 INJECTION, EMULSION INTRAVENOUS at 07:41

## 2025-01-24 RX ADMIN — LIDOCAINE HYDROCHLORIDE 40 MG: 20 INJECTION INTRAVENOUS at 07:29

## 2025-01-24 RX ADMIN — CEFAZOLIN 2 G: 1 INJECTION, POWDER, FOR SOLUTION INTRAMUSCULAR; INTRAVENOUS at 07:30

## 2025-01-24 RX ADMIN — PROPOFOL 20 MG: 10 INJECTION, EMULSION INTRAVENOUS at 07:32

## 2025-01-24 RX ADMIN — PROPOFOL 20 MG: 10 INJECTION, EMULSION INTRAVENOUS at 07:49

## 2025-01-24 ASSESSMENT — PAIN SCALES - GENERAL
PAINLEVEL_OUTOF10: 4
PAINLEVEL_OUTOF10: 5 - MODERATE PAIN
PAINLEVEL_OUTOF10: 2
PAIN_LEVEL: 0

## 2025-01-24 ASSESSMENT — PAIN - FUNCTIONAL ASSESSMENT
PAIN_FUNCTIONAL_ASSESSMENT: 0-10

## 2025-01-24 NOTE — H&P
History Of Present Illness  Carrie Rosales is a 59 y.o. female presenting with EUA repair of vaginal cuff dehiscence without evisceration.     Past Medical History  She has a past medical history of Arthritis, Depression, unspecified, Dorsopathy, unspecified, Endometrial cancer (Multi), Essential (primary) hypertension, Gastro-esophageal reflux disease without esophagitis, Headache, unspecified, Hepatic steatosis, Hiatal hernia, IBS (irritable bowel syndrome), MORGAN (nonalcoholic steatohepatitis), RJ (obstructive sleep apnea), Palpitations, Panic disorder (episodic paroxysmal anxiety), Pelvic kidney, Postmenopausal bleeding, Vision loss, and Vitamin D deficiency, unspecified.    Surgical History  She has a past surgical history that includes Tonsillectomy (02/25/2014); Cholecystectomy; Endometrial biopsy; and Colonoscopy.    Oncology History Overview Note   9/19/24 Pelvic US uterus 8.7 x 3.9 x 4.2 cm  EML 1.9 cm  10/10/24 EMBx Endometrial adenocarcinoma, endometrioid type, FIGO grade 1 MMRp p53wt     Endometrial adenocarcinoma (Multi)   10/17/2024 Initial Diagnosis    Endometrial adenocarcinoma (Multi)         Social History  She reports that she has never smoked. She has never been exposed to tobacco smoke. She has never used smokeless tobacco. She reports that she does not currently use alcohol after a past usage of about 1.0 - 2.0 standard drink of alcohol per week. She reports that she does not use drugs.     Allergies  Patient has no known allergies.    Review of Systems   Genitourinary:  Positive for vaginal bleeding.   All other systems reviewed and are negative.       Physical Exam  Vitals and nursing note reviewed. Exam conducted with a chaperone present.   Constitutional:       General: She is not in acute distress.     Appearance: Normal appearance.   HENT:      Head: Normocephalic and atraumatic.      Mouth/Throat:      Mouth: Mucous membranes are moist.      Pharynx: No oropharyngeal exudate or  Transition planning  Spoke with patient at bedside, she states plan is to go home alone, does not want home care as she states she is doing well with PT/OT. posterior oropharyngeal erythema.   Eyes:      Extraocular Movements: Extraocular movements intact.      Conjunctiva/sclera: Conjunctivae normal.      Pupils: Pupils are equal, round, and reactive to light.   Neck:      Thyroid: No thyroid mass or thyromegaly.   Cardiovascular:      Rate and Rhythm: Normal rate and regular rhythm.      Pulses: Normal pulses.      Heart sounds: Normal heart sounds.   Pulmonary:      Effort: Pulmonary effort is normal.      Breath sounds: Normal breath sounds. No wheezing, rhonchi or rales.   Abdominal:      General: Bowel sounds are normal. There is no distension.      Palpations: Abdomen is soft. There is no mass.      Tenderness: There is no abdominal tenderness.      Hernia: No hernia is present.   Musculoskeletal:         General: No tenderness. Normal range of motion.      Cervical back: Normal range of motion and neck supple. No tenderness.      Right lower leg: No edema.      Left lower leg: No edema.   Skin:     General: Skin is warm.      Findings: No lesion or rash.   Neurological:      General: No focal deficit present.      Mental Status: She is alert and oriented to person, place, and time.   Psychiatric:         Mood and Affect: Mood normal.         Behavior: Behavior normal.          Last Recorded Vitals  There were no vitals taken for this visit.    Relevant Results  None     Assessment/Plan   Assessment & Plan  Dehiscence of vaginal cuff      - consent to be signed DOS   - proceed with surgery as scheduled         Shantel Motley MD MPH

## 2025-01-24 NOTE — OP NOTE
"Date: 2025  OR Location: [facility] OR    Name: Dayanara Rosales \"Carrie\", : 1965, Age: 59 y.o., MRN: [id number], Sex: female    Diagnosis  Pre-op Diagnosis      * Dehiscence of vaginal cuff, initial encounter [T81.328A] Post-op Diagnosis     * Dehiscence of vaginal cuff, initial encounter [T81.328A]     Procedures  exam under anesthesia, repair of vaginal cuff dehiscence  [id number] - NE CLOSURE SUPERF WND DEHIS SIMPLE    Surgeons      * Shantel Motley - Primary    Resident/Fellow/Other Assistant:  Surgeons and Role:     * Domenica Langston MD - Resident - Assisting    Staff:   Circulator: Vijay Hansen Person: Kuldeep Wade Circulator: [place]    Anesthesia Staff: Anesthesiologist: Lu Wakefield MD  Anesthesia Resident: Priya Frederick MD    Procedure Summary  Anesthesia: General  ASA: III  Estimated Blood Loss: 5 mL  Intra-op Medications:   Administrations occurring from 0700 to 0835 on 25:   Medication Name Total Dose   sodium chloride 0.9 % irrigation solution 1,000 mL   ceFAZolin (Ancef) 1 g 2 g   fentaNYL (Sublimaze) injection 50 mcg/mL 75 mcg   LR infusion Cannot be calculated   lidocaine (cardiac) injection 2% prefilled syringe 40 mg   midazolam PF (Versed) injection 1 mg/mL 2 mg   propofol (Diprivan) injection 10 mg/mL 200 mg              Anesthesia Record               Intraprocedure I/O Totals          Intake    LR infusion 300.00 mL    Total Intake 300 mL          Specimen: No specimens collected      Procedure Details:  Indication: Carrie Rosales is a 59 y.o. female with FIGO grade 1 endometrial adenocarcinoma, s/p TLH, BSO, SLND, cytoscopy on , presenting with EUA repair of vaginal cuff dehiscence without evisceration.     Findings: Surgically absent uterus and cervix. Approximately 2 cm defect in L lateral aspect of vaginal cuff. No evisceration.     Procedure Note:  The patient was taken to the operating room where MAC was administered. She was then positioned in the dorsal " lithotomy position and she was prepped and draped in the normal sterile fashion. A bimanual exam was performed. The above findings were noted. Anterior and posterior retractors were placed in the vagina for visualization of the cuff. Allis forceps were placed on the anterior and posterior portions of the cuff. Defect was re approximated with two separate interrupted stitches of 1-0 PDS. An exam was performed and the defect was confirmed to be closed. All instruments were removed from the vagina.   All counts were correct. Dr. Motley was present for the entire procedure. The patient was taken from the operating room to PACU in stable condition.

## 2025-01-24 NOTE — ANESTHESIA PREPROCEDURE EVALUATION
"Patient: Dayanara Rosales \"Carrie\"    Procedure Information       Anesthesia Start Date/Time: 01/24/25 0719    Procedure: exam under anesthesia, repair of vaginal cuff dehiscence    Location: [facility] OR 03 / [facility] OR    Surgeons: Shantel Motley MD MPH            Relevant Problems   Cardiac   (+) Abnormal EKG   (+) Hyperlipidemia   (+) Hypertension      Pulmonary   (+) RJ (obstructive sleep apnea)      Neuro   (+) Anxiety disorder      GI   (+) Gastroesophageal reflux disease with esophagitis without hemorrhage   (+) Gastroesophageal reflux disease without esophagitis   (+) Hiatal hernia      Musculoskeletal   (+) Chronic midline low back pain without sciatica      GYN   (+) Endometrial adenocarcinoma (Multi)   (+) Endometrial carcinoma (Multi)       Clinical information reviewed:   Tobacco  Allergies  Meds   Med Hx  Surg Hx   Fam Hx  Soc Hx        NPO Detail:  NPO/Void Status  Date of Last Liquid: 01/24/25  Time of Last Liquid: 0000  Date of Last Solid: 01/24/25  Time of Last Solid: 0000         Physical Exam    Airway  Mallampati: III  TM distance: >3 FB  Neck ROM: full  Comments: Can bite upper lip   Cardiovascular   Rhythm: regular  Rate: normal     Dental - normal exam     Pulmonary   Breath sounds clear to auscultation     Abdominal          Anesthesia Plan    History of general anesthesia?: yes  History of complications of general anesthesia?: no    ASA 3     MAC     intravenous induction   Anesthetic plan and risks discussed with patient.  Use of blood products discussed with patient who consented to blood products.    Plan discussed with resident and attending.    "

## 2025-01-24 NOTE — DISCHARGE INSTRUCTIONS
Vaginal Surgery Discharge Instructions  If you have any questions about your care, please contact us at [phone].    Medications and Pain Management  Taking regular anti-inflammatory pills, such as 600mg of Ibuprofen every 6 hours for the first 5 days and then as needed is recommended. You can alternate ibuprofen with tylenol (975mg or 1000mg). The tylenol can be taken every 6 hours.  If you have problems using NSAIDs, be sure to discuss this with your doctor. You may begin your usual home medications as you were taking before unless directed by your doctor. Vaginal estrogen pills were also sent to your pharmacy.    Vaginal care  No tub baths for six weeks following surgery. You may have a mildly malodorous discharge and occasional spotting for up to 6 weeks. Do not put anything in the vagina like tampons or have sexual intercourse for 6 weeks after surgery. If you are having bleeding like a period, that is abnormal and you should contact your doctor.  Pelvic rest for 6 weeks.  This means nothing to enter the vagina:  No sex, douching, tub baths, hot tubs, or swimming.    Urinating  Because the bladder is disturbed by the surgery, the normal sensation may be temporarily altered. You may not be aware that your bladder is full. If the bladder is allowed to get over distended, it may make the problem worse. This is why we make sure that you are able to empty your bladder adequately before you go home. For the first few days at home, you should make a point to empty your bladder every 3 to 4 hours. Pain with voiding, especially after the first day, is not expected and may represent a bladder infection.    Activity  For the first two days post-operatively, your soreness and recovery from anesthesia will limit your activity  Stairs are safe, just take your time  At a minimum during this time, you should walk around for 10-15 minutes every 2-3 hours. After that, in the first week, any activity except for overt  exercise is safe.   During the first week you should not commit to being on your feet for more than 30 minutes at a time.   During the second week, light exercise is encouraged.  After 2 weeks from surgery, you should try to get back into regular activity other than heavy lifting.   For healing, please limit the amount of weight lifted to 8-10 pounds (a gallon of milk) for the first 6 weeks after surgery.   Driving is usually okay after the first few days. You must be able to comfortably wear a seatbelt, press the gas/brake pedals, and drive defensively. You may not drive while taking narcotic pain medicines.    When to Call the Doctor  Call for any fever above 100.4 F (If you do not feel feverish you do not have to routinely check your temperature.)  Call for severe pain not improved by medications  Call for persistent nausea, vomiting  Call for vaginal bleeding that is heavy as a period or passing blood clots larger than a quarter  Call for unusual swelling in your legs  Call if the incisions develop painful redness and discharge    In an emergency, call 911 or go to an Emergency Department at a nearby hospital

## 2025-01-24 NOTE — ANESTHESIA POSTPROCEDURE EVALUATION
"Patient: Dayanara Rosales \"Carrie\"    Procedure Summary       Date: 01/24/25 Room / Location: [facility] OR 03 / [facility] [place] [facility]    Anesthesia Start: 0719 Anesthesia Stop: 0822    Procedure: exam under anesthesia, repair of vaginal cuff dehiscence Diagnosis:       Dehiscence of vaginal cuff, initial encounter      (Dehiscence of vaginal cuff, initial encounter [T81.328A])    Surgeons: Shantel Motley MD MPH Responsible Provider: Lu Wakefield MD    Anesthesia Type: MAC ASA Status: 3            Anesthesia Type: No value filed.    Vitals Value Taken Time   /61 01/24/25 0815   Temp 36.2 01/24/25 0822   Pulse 59 01/24/25 0817   Resp 15 01/24/25 0822   SpO2 97 % 01/24/25 0817   Vitals shown include unfiled device data.    Anesthesia Post Evaluation    Patient location during evaluation: PACU  Patient participation: complete - patient participated  Level of consciousness: awake  Pain score: 0  Pain management: adequate  Airway patency: patent  Cardiovascular status: acceptable  Respiratory status: acceptable  Hydration status: acceptable  Postoperative Nausea and Vomiting: none        No notable events documented.    "
1

## 2025-01-26 LAB
LAB AP ASR DISCLAIMER: NORMAL
LAB AP BLOCK FOR ADDITIONAL STUDIES: NORMAL
LABORATORY COMMENT REPORT: NORMAL
PATH REPORT.COMMENTS IMP SPEC: NORMAL
PATH REPORT.FINAL DX SPEC: NORMAL
PATH REPORT.GROSS SPEC: NORMAL
PATH REPORT.RELEVANT HX SPEC: NORMAL
PATH REPORT.TOTAL CANCER: NORMAL
PATHOLOGY SYNOPTIC REPORT: NORMAL

## 2025-01-26 PROCEDURE — 88341 IMHCHEM/IMCYTCHM EA ADD ANTB: CPT | Performed by: STUDENT IN AN ORGANIZED HEALTH CARE EDUCATION/TRAINING PROGRAM

## 2025-01-26 PROCEDURE — 88342 IMHCHEM/IMCYTCHM 1ST ANTB: CPT | Performed by: STUDENT IN AN ORGANIZED HEALTH CARE EDUCATION/TRAINING PROGRAM

## 2025-02-06 ENCOUNTER — OFFICE VISIT (OUTPATIENT)
Dept: GYNECOLOGIC ONCOLOGY | Facility: HOSPITAL | Age: 60
End: 2025-02-06
Payer: COMMERCIAL

## 2025-02-06 VITALS
HEART RATE: 67 BPM | SYSTOLIC BLOOD PRESSURE: 132 MMHG | DIASTOLIC BLOOD PRESSURE: 87 MMHG | TEMPERATURE: 97.3 F | WEIGHT: 190 LBS | BODY MASS INDEX: 34 KG/M2

## 2025-02-06 DIAGNOSIS — Z90.721 S/P HYSTERECTOMY WITH OOPHORECTOMY: Primary | ICD-10-CM

## 2025-02-06 DIAGNOSIS — C54.1 ENDOMETRIAL ADENOCARCINOMA (MULTI): ICD-10-CM

## 2025-02-06 DIAGNOSIS — T81.328A DEHISCENCE OF VAGINAL CUFF, INITIAL ENCOUNTER: ICD-10-CM

## 2025-02-06 DIAGNOSIS — Z90.710 S/P HYSTERECTOMY WITH OOPHORECTOMY: Primary | ICD-10-CM

## 2025-02-06 PROCEDURE — 99211 OFF/OP EST MAY X REQ PHY/QHP: CPT | Performed by: STUDENT IN AN ORGANIZED HEALTH CARE EDUCATION/TRAINING PROGRAM

## 2025-02-06 PROCEDURE — 3075F SYST BP GE 130 - 139MM HG: CPT | Performed by: STUDENT IN AN ORGANIZED HEALTH CARE EDUCATION/TRAINING PROGRAM

## 2025-02-06 PROCEDURE — 3079F DIAST BP 80-89 MM HG: CPT | Performed by: STUDENT IN AN ORGANIZED HEALTH CARE EDUCATION/TRAINING PROGRAM

## 2025-02-06 ASSESSMENT — PAIN SCALES - GENERAL: PAINLEVEL_OUTOF10: 0-NO PAIN

## 2025-02-06 NOTE — PROGRESS NOTES
Patient ID: Carrie Rosales is a 59 y.o. female.  Referring Physician: No referring provider defined for this encounter.  Primary Care Provider: HAILEE Pena-CNP    Subjective    Here for POV s/p Laparoscopic Hysterectomy, Bilateral Salpingo-Oophorectomy, Bilateral Pelvic [place] Lymph Node Biopsy, Cystoscopy, Bilateral Tap Blocks on 12/31/24 c/b cuff dehiscence s/p repair on 1/24/25. Pain is improved. Denies NV. Denies vaginal bleeding. Voiding without difficulty. Using vaginal estrogen when she remembers.    Objective    BSA: 1.95 meters squared  /87 (BP Location: Right arm, Patient Position: Sitting, BP Cuff Size: Adult)   Pulse 67   Temp 36.3 °C (97.3 °F)   Wt 86.2 kg (190 lb)   BMI 34.00 kg/m²      Physical Exam  Vitals and nursing note reviewed. Exam conducted with a chaperone present.   Constitutional:       Appearance: Normal appearance. She is normal weight.   HENT:      Head: Normocephalic and atraumatic.      Mouth/Throat:      Mouth: Mucous membranes are moist.   Eyes:      Extraocular Movements: Extraocular movements intact.      Conjunctiva/sclera: Conjunctivae normal.      Pupils: Pupils are equal, round, and reactive to light.   Cardiovascular:      Rate and Rhythm: Normal rate.   Abdominal:      General: Bowel sounds are normal.      Palpations: Abdomen is soft. There is no mass.      Tenderness: There is no guarding or rebound.      Hernia: No hernia is present.   Genitourinary:     General: Normal vulva.      Vagina: Normal. No vaginal discharge, erythema or lesions.      Comments: Surgically absent uterus and cervix. Suture x 2 visualized with stitch extending 2 cm in the vagina. Cuff well approximated. No blood in the vault.   Musculoskeletal:         General: Normal range of motion.      Cervical back: Normal range of motion and neck supple.   Skin:     General: Skin is warm.      Findings: No erythema or rash.   Neurological:      General: No focal deficit present.       Mental Status: She is alert and oriented to person, place, and time. Mental status is at baseline.   Psychiatric:         Mood and Affect: Mood normal.         Behavior: Behavior normal.         Performance Status:  Asymptomatic    Assessment/Plan     Oncology History Overview Note   9/19/24 Pelvic US uterus 8.7 x 3.9 x 4.2 cm  EML 1.9 cm  10/10/24 EMBx Endometrial adenocarcinoma, endometrioid type, FIGO grade 1 MMRp p53wt  12/31/25 TLH BSO SLNDx noninvasive - LVI Endometrial carcinoma, endometrioid type, FIGO grade 1 MMRp p53wt c/w stage IA1       Endometrial adenocarcinoma (Multi)   10/17/2024 Initial Diagnosis    Endometrial adenocarcinoma (Multi)          Diagnoses and all orders for this visit:  Dehiscence of vaginal cuff, initial encounter  Endometrial adenocarcinoma (Multi)  S/P hysterectomy with oophorectomy     # Endometrial cancer  - Discussed the significance of histologic subtype, grade and stage  - Pathology discussed noninvasive low grade endometrial cancer  - Recommend surveillance  - Signs/symptoms of recurrence discussed    # Vaginal cuff dehiscence s/p repair  - Continue pelvic rest 4 weeks  - Ok to discontinue vaginal estrogen    # Post op  - Recovering well    RTC 4 months    Shantel Motley MD MPH

## 2025-02-11 NOTE — PROGRESS NOTES
"History of Present Illness:  Dayanara Rosales \"Carrie\" is a 59 y.o. female with a personal history of endometrial cancer, and a family history of breast, pancreatic, and skin cancer, as well as CLL.  Dayanara Rosales \"Carrie\" was referred to the Cancer Genetics Clinic at [facility] by Nisha Viera MD. Dayanara Rosales \"Carrie\" is interested in genetic testing to clarify her personal risk for cancer, as well as the risks to her family members.     Cancer Medical History:  Personal history of cancer? Yes  Type: 59 y.o. with FIGO grade 1 endometrioid endometrial adenocarcinoma, MMRp, p53WT.   Age at diagnosis: 59  Summary: Per Dr. Motley -  9/19/24 Pelvic US uterus 8.7 x 3.9 x 4.2 cm  EML 1.9 cm  10/10/24 EMBx Endometrial adenocarcinoma, endometrioid type, FIGO grade 1 MMRp p53wt  12/31/24 TLH BSO SLNDx noninvasive - LVI Endometrial carcinoma, endometrioid type, FIGO grade 1 MMRp p53wt c/w stage IA1   Following up with Dr. Motley in 4 months (June 2025)    Surgical pathology exam from 12/31/24;    A. SENTINEL LYMPH NODE, PELVIC, LEFT:   -- One lymph node, negative for carcinoma (0/1)     B. SENTINEL LYMPH NODE, PELVIC, RIGHT:   -- One lymph node, negative for carcinoma (0/1)     C. UTERUS, CERVIX, FALLOPIAN TUBES AND OVARIES BILATERAL:    -- Endometrial carcinoma, endometrioid type, FIGO grade 1, no myometrial invasion (see comment and synoptic report)   -- Myometrium: Leiomyomata  -- Cervix: Endocervical polyp and tunnel clusters   -- Left fallopian tube: Papillary cystadenoma, pseudoxanthogranulomatous salpingitis  -- Right fallopian tube: No pathologic diagnosis  -- Left ovary: Endometriotic cyst  -- Right ovary: Cortical inclusion cysts      Bilateral fallopian tubes and ovaries are entirely submitted for histological evaluation.      Immunohistochemical testing (IHC) was performed on prior biopsy ([id number], A1). p53 IHC is normal (wild type), expression pattern: patchy nuclear staining. MLH1, " MSH2, MSH6 and PMS2 IHCs show normal mismatch repair protein expression.      Section of the left fallopian tube (C16) shows a well circumscribed papillary proliferation with delicate fibrovascular cores lined by bland epithelial cells with eosinophilic cytoplasm and round to oval nuclei, measuring 2.3 mm in greatest dimension. No mitotic figure is identified. Immunohistochemical stains performed on C16 show the papillary proliferation is positive for PAX8, WT1, CD10, AR, EMA, calretinin (patchy), and p16 (patchy), p53 wild type, and negative for GATA3, TTF1, , inhibin, ER and AK. Ki67 proliferation index is low in the epithelial cells. The morphologic and immunohistochemical features are compatible with papillary cystadenoma. It has been reported that papillary cystadenoma has strong association with von Hippel-Lindau disease (see references). Clinical investigations are recommended to confirm or rule out the possibility of von Hippel-Lindau disease.     p53 immunostain performed on C21 shows wild type expression.    History of other cancers: No    Prior hereditary cancer (germline) genetic testing? No    Prior hereditary cancer (germline) genetic testing in family members? No    Cancer screening history:  Mammograms? Yes, most recent 3/2024, negative.  Annual MMG. Patient denies personal history of breast biopsy.   PAP smear?   06/2022 NILM, [facility]PV    Colonoscopy? Yes, most recent 2019 normal per patient report, no polyps and return in 10 years. This was her first colonoscopy  Upper endoscopy? Yes, most recent 2017. Done because she couldn't smell or taste anything; negative per patient report    Dermatology? No - Hasn't ever been to a dermatologist  Other cancer screening? No    Reproductive History:  Number of children:  2  Number of pregnancies:  2  Age first birth:  30  Breast feeding? Yes  Menarche (age):  12  Menopause (age): 54  OCP: Yes for 6 months  HRT: No    Hysterectomy? Yes -  2024  Oophorectomy? Yes - 2024      Family history:  A 4-generation pedigree was obtained and was significant for the following:     - Brother diagnosed/ from pancreatic cancer at 64, reported to have chronic pancreatitis.    - Mother diagnosed with unilateral breast cancer at 51. Diagnosed with pancreatic cancer at 91,  2 months after. Unknown if she had pancreatitis.    - Father with history of unspecified skin cancer on his face, arms. Diagnosed with CLL at 88.  at 91 from a fall in a nursing home.G    Maternal ancestry is [place].  Paternal ancestry is [place]raini, [place]ali. There is no known Ashkenazi Restorationism ancestry. Consanguinity was denied.       Genetic counseling:    Summary  Ms. Román Rosales is a 59 y.o. female with a personal history of endometrial cancer, and a family history of breast, pancreatic, and skin cancer, as well as CLL.     Dayanara Rosales's reported history is concerning for possible hereditary  cancer. Ms. Román Rosales meets current national (NCCN) criteria for testing of pancreatic cancer susceptibility genes, including:  JAZMIN, BRCA1, BRCA2, CDKN2A, Rock syndrome genes [MLH1, MSH2, MSH6, EPCAM], PALB2, STK11, and TP53 and Rock syndrome susceptibility genes, including MLH1, MSH2, MSH6, PMS2, and EPCAM.     Testing is medically necessary, as it could help clarify cancer risks in the family, and may impact recommendations for cancer surveillance and/or the need for risk-reducing options.    Dayanara is interested in testing, which is recommended, and was ordered today via the [facility] CancerNext-Expanded +RNA Insight panel, which examines the following 76 genes:  AIP, ALK, APC, JAZMIN, AXIN2, BAP1, BARD1, BMPR1A, BRCA1, BRCA2, BRIP1, CDC73, CDH1, CDK4, CDKN1B, CDKN2A, CEBPA, CHEK2, CTNNA1, DDX41, DICER1, EGFR, EPCAM, ETV6, FH, FLCN, GATA2, GREM1, HOXB13, , KIT, LZTR1, MAX, MBD4, MEN1, MET, MITF, MLH1, MSH2, MSH3, MSH6, MUTYH, NF1, NF2, NTHL1, PALB2, PDGFRA, PHOX2B, PMS2, POLD12,  POLE, POT1, EDNRD6E, PTCH1, PTEN, RAD51C, RAD51D, RB1, RET, RUNX1, SDHA, SDHAF2, SDHB, SDHC, SDHD, SMAD4, SMARCA4, SMARCB1, SMARCE1, STK11, SUFU, CTSD163, TP53, TSC1, TSC2, WT1, VHL with  Limited Evidence Genes (9 genes): ATRIP, EGLN1, KIF1B, MLH3, PALLD, RAD51B, RNF43, RPS20, TERT  and Pancreatitis Genes (5 genes): CFTR, CPA1, CTRC, PRSS1, SPINK1.. Our discussion is summarized below.    Genetic Counseling Discussion    We reviewed genes and chromosomes, inherited forms of pancreatic cancer.  We discussed that most cancers are not due to an inherited genetic susceptibility.  However, in about 5-10% of families, there is an inherited genetic mutation that can make a person more susceptible to developing certain forms of cancer.  Within families with a genetic predisposition to cancer, we often see certain patterns, such as multiple family members with cancer and cancers occurring in multiple generations. In addition, earlier onset and/or bilateral cancers are suggestive of an inherited predisposition. There also tends to be a clustering of certain types of related cancer in these families, such as breast and ovarian cancers, or colon and uterine cancers.     Approximately 5-10% of pancreatic cancer is due to an inherited mutation. We discussed that there are no single-genes that are associated with only pancreatic cancer, but rather pancreatic cancer is an associated cancer with other hereditary cancer predisposition syndromes.     For example, we discussed that mutations in the BRCA1 and BRCA2 genes typically present with early-onset breast cancer and ovarian cancer and can also be associated with other cancers, such as pancreatic cancer.  Of note, we know that at least 2-5% individuals with pancreatic cancer will have a pathogenic BRCA1 or BRCA2 variant.    Mutations in these genes are inherited in a dominant pattern and confer up to an 72% lifetime risk for breast cancer. This is elevated compared to the general  population risk of 10-12%. In addition, BRCA1 and BRCA2 mutation carriers have up to a 58% lifetime risk for ovarian cancer, which is elevated over the 2% general population risk.  Mutation carriers who have already been diagnosed with cancer have an increased risk to develop a second, contralateral breast cancer. Additionally, mutations in BRCA1 and BRCA2 have also been associated with an increased risk for male breast cancer, prostate cancer, and pancreatic cancer.     Gene mutations in BRCA1 and BRCA2 are inherited in an autosomal dominant fashion. This means that if an individual has a mutation in either of these genes, their siblings and children have a 50% chance of also having the same gene mutation and a 50% chance of not having the gene mutation.     We discussed that there are multiple other genes associated with increased pancreatic cancer risk. Some of them are considered to be highly penetrant genes, with an even higher risk than the BRCA1 and BRCA2 genes. One of these genes is the CDKN2A gene associated with autosomal dominant melanoma-pancreatic cancer syndrome, also known as familial atypical multiple mole melanoma syndrome, or FAMMMS. Some families with a CDKN2A mutation/pathogenic variant will only present with pancreatic cancer or melanoma, not necessarily both. The lifetime risk of pancreatic cancer in individuals with pathogenic CDKN2A variants has been reported to range from 11-58%.     We discussed Rock syndrome, an autosomal dominant condition caused by germline mutations in one of five genes - MLH1, MSH2, MSH6, PMS2, and EPCAM. Mutation carriers have an increased risk to develop colorectal cancer over their lifetime, as well as an increased risk for a second primary colon cancer. In addition to the risk for colon cancer, women with Rock syndrome also have an increased risk for endometrial (uterine) cancer and ovarian cancer. Other cancers associated with Rock syndrome include gastric  cancer, hepatobiliary, small bowel, urinary tract, and rarely pancreatic cancer. For individuals with Rock syndrome, there are medical management options, such as more frequent colonoscopies, that can be considered to help manage the risk for cancer.  A screening test for Rock syndrome was performed on Ms. Román Rosales's tumor specimen, which came back normal. This screening test looks to see if the Rock syndrome proteins were expressed in the cancer or not. All four proteins were expressed, which is a normal screening result which greatly reduces the chance that Ms. Román Rosales has Rock syndrome.    However, Dayanara's surgical pathology exam did report a papillary cystadenoma, which have been associated with von Hippel-Lindau. We briefly discussed key features of VHL - including hemangioblastomas of the brain, spinal cord, and retina; renal cysts and clear cell renal cell carcinoma; pheochromocytoma and paraganglioma; pancreatic cysts and neuroendocrine tumors. Ms. [place] denied any personal/family history of these features. Of note - Ms. [place] did not know the specific type of pancreatic cancer in her family members. We discussed that VHL will be included in her testing.    There are also other genes associate with increased cancer risk, some of which are considered to be moderate risk genes. For many of the moderate risk genes, there is sometimes limited information regarding the degree to which a mutation in the gene affects risk of different types of cancers. Additionally, for some of these moderate risk genes, the appropriate management for individuals who have a mutation in one of these genes is not always clear. In many cases, even if an individual tests positive for a mutation in a moderate risk gene, recommendations are still based on the family history, not the positive test result.    Dayanara Rosales was counseled about hereditary cancer susceptibility including cancer risks, options  for increased screening and/or risk reduction, genetic testing, and the implications for other family members.  We discussed different panel options available to Ms. Román Rosales. After a discussion about the risks, benefits, and limitations of genetic testing, Dayanara Rosales elected to undergo genetic testing for hereditary cancer using the panel described above. Dayanara Rosales gave verbal consent to proceed with testing.    We reviewed the types of results we can get back:   - A positive result means that we identified a change in a gene that confers an increased cancer risk for cancer. We will discuss potential changes in management for her and her family based on the specific gene mutation found.    - A negative (normal) result clears her for many cancer predisposition syndromes, but cannot clear her for any/all cancer predisposition risks. She would continue to be screened based on her personal and family history.    - A Variant of Uncertain Significance (VUS) means that some kind of change was found in a gene, but it is currently unknown whether this specific change is harmful.  These results do not  recommendations or warrant familial variant testing.    We reviewed the limitations of testing an unaffected individual for a hereditary cancer syndrome. When testing an individual who has not had a cancer diagnosis, like Ms. Román Rosales, a negative test result may have limited utility in defining future cancer risks, as it is unclear whether the affected family members had a mutation that the unaffected individual did not inherit, or alternatively, whether the family history of cancer is due to unidentifiable risk factors that are shared between the unaffected individual and their family members. Testing for her mother or brother would be most informative for Ms. Román Rosales's pancreatic cancer risk. Ms. Román Rosales stated that her understanding of these limitations. We  discussed that even if she tests negative, there are pancreatic cancer screening options available to her at .    Ms. Román Rosales will return to the Cancer Genetics Clinic to discuss her testing results.  At that time, we will make recommendations for both Ms. Román Rosales and their family members in terms of cancer screening and/or cancer risk reduction options. I encourages her to gather more information about her husbands family history of cancer and genetic testing, as she noted that he had genetic testing about 10 years ago. Ms. Román Rosales was reminded to follow her primary care providers' recommendations for age-related cancer screenings, such as mammograms, colonoscopies, etc.    We discussed that Dayanara Rosales's test results may be released to her chart when they are reported. Most people choose to review the results with their provider at their follow up visit. However, if she chooses to view her results in advance of her visit, the provider may not be available to discuss. Dayanara Rosales was advised to keep her follow-up appointment regardless of her results to discuss appropriate management and referrals.    PLAN:  Ms. Román Rosales elected to undergo genetic testing for hereditary cancer using the [employer] CancerNext-Expanded +RNA Insight panel, which examines the following 76 genes:  AIP, ALK, APC, JAZMIN, AXIN2, BAP1, BARD1, BMPR1A, BRCA1, BRCA2, BRIP1, CDC73, CDH1, CDK4, CDKN1B, CDKN2A, CEBPA, CHEK2, CTNNA1, DDX41, DICER1, EGFR, EPCAM, ETV6, FH, FLCN, GATA2, GREM1, HOXB13, , KIT, LZTR1, MAX, MBD4, MEN1, MET, MITF, MLH1, MSH2, MSH3, MSH6, MUTYH, NF1, NF2, NTHL1, PALB2, PDGFRA, PHOX2B, PMS2, POLD12, POLE, POT1, CGQBQ4X, PTCH1, PTEN, RAD51C, RAD51D, RB1, RET, RUNX1, SDHA, SDHAF2, SDHB, SDHC, SDHD, SMAD4, SMARCA4, SMARCB1, SMARCE1, STK11, SUFU, HGOO423, TP53, TSC1, TSC2, WT1, VHL with  Limited Evidence Genes (9 genes): ATRIP, EGLN1, KIF1B, MLH3, PALLD, RAD51B, RNF43, RPS20, TERT   and Pancreatitis Genes (5 genes): CFTR, CPA1, CTRC, PRSS1, SPINK1.  Dayanara Rosales gave their verbal consent to proceed with testing.    Ms. Román Rosales had her blood drawn today   Results are typically available within 3-4 weeks from the time of sample reception. Ms. Román Rosales will return to the Cancer Genetics Clinic discuss her test results.  Our  will call Dayanara Rosales when results return to make a follow up appointment.  We remain available to Ms. Román Rosales at [phone] if any questions arise regarding information discussed at today's visit.    Pearl Miner MS, GC  Licensed Genetic Counselor  [facility]  Phone: [phone]     Total time spent on day of encounter: 64 minutes (42 minutes with patient, 22 minutes on pre/post patient care activities, including documentation).

## 2025-02-12 ENCOUNTER — CLINICAL SUPPORT (OUTPATIENT)
Dept: GENETICS | Facility: CLINIC | Age: 60
End: 2025-02-12
Payer: COMMERCIAL

## 2025-02-12 VITALS
TEMPERATURE: 97.3 F | OXYGEN SATURATION: 99 % | DIASTOLIC BLOOD PRESSURE: 91 MMHG | WEIGHT: 191.7 LBS | BODY MASS INDEX: 34.31 KG/M2 | SYSTOLIC BLOOD PRESSURE: 138 MMHG | HEART RATE: 61 BPM | RESPIRATION RATE: 18 BRPM

## 2025-02-12 DIAGNOSIS — Z80.6 FAMILY HISTORY OF CLL (CHRONIC LYMPHOID LEUKEMIA): ICD-10-CM

## 2025-02-12 DIAGNOSIS — Z71.83 ENCOUNTER FOR NONPROCREATIVE GENETIC COUNSELING: ICD-10-CM

## 2025-02-12 DIAGNOSIS — Z80.3 FAMILY HISTORY OF BREAST CANCER: ICD-10-CM

## 2025-02-12 DIAGNOSIS — Z80.0 FAMILY HISTORY OF PANCREATIC CANCER: ICD-10-CM

## 2025-02-12 DIAGNOSIS — Z80.8 FAMILY HISTORY OF SKIN CANCER: ICD-10-CM

## 2025-02-12 DIAGNOSIS — C54.1 ENDOMETRIAL ADENOCARCINOMA (MULTI): ICD-10-CM

## 2025-02-12 PROCEDURE — 96041 GENETIC COUNSELING SVC EA 30: CPT

## 2025-02-12 ASSESSMENT — PAIN SCALES - GENERAL: PAINLEVEL_OUTOF10: 0-NO PAIN

## 2025-02-20 ENCOUNTER — APPOINTMENT (OUTPATIENT)
Dept: GYNECOLOGIC ONCOLOGY | Facility: HOSPITAL | Age: 60
End: 2025-02-20
Payer: COMMERCIAL

## 2025-02-27 ENCOUNTER — TELEPHONE (OUTPATIENT)
Dept: GENETICS | Facility: CLINIC | Age: 60
End: 2025-02-27
Payer: COMMERCIAL

## 2025-03-03 LAB
COMMENTS - MP RESULT TYPE: NORMAL
SCAN RESULT: NORMAL

## 2025-03-13 NOTE — PROGRESS NOTES
Genetic counseling follow-up visit:  Ms. Román Rosales is a 59 y.o. female presenting today for virtual follow-up in the [facility]etics [facility].  During her prior visit to the  Cancer Genetics Clinic, she chose to pursue hereditary cancer testing via the 90- gene [employer] panel due to her personal history of endometrial cancer, and a family history of breast, pancreatic, and skin cancer, as well as CLL. Ms. 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 Testing also identified a VUS in NTHL1. They were reviewed with Ms. Román Rosales today at the time of her visit. Dayanara Rosales has access to the results via   [employer].    Family History: as previously reported, no changes  - Patient, with  FIGO grade 1 endometrioid endometrial adenocarcinoma, MMRp, p53WT.   - Brother diagnosed/ from pancreatic cancer at 64, reported to have chronic pancreatitis.  - Mother diagnosed with unilateral breast cancer at 51. Diagnosed with pancreatic cancer at 91,  2 months after. Unknown if she had pancreatitis.  - Father with history of unspecified skin cancer on his face, arms. Diagnosed with CLL at 88.  at 91 from a fall in a nursing home.G     Maternal ancestry is [place]ic.  Paternal ancestry is [place]ian, Syrian. There is no known Ashkenazi [place] ancestry. Consanguinity was denied.     Test Results:  Please refer to the genetic test report from [employer] scanned under Media, attached to this encounter. Results are summarized here.     Test Performed: [facility] CancerNext-Expanded +RNA Insight panel, which examines the following 76 genes:  AIP, ALK, APC, JAZMIN, AXIN2, BAP1, BARD1, BMPR1A, BRCA1, BRCA2, BRIP1, CDC73, CDH1, CDK4, CDKN1B, CDKN2A, CEBPA, CHEK2, CTNNA1, DDX41, DICER1, EGFR, EPCAM, ETV6, FH, FLCN, GATA2, GREM1, HOXB13, , KIT, LZTR1, MAX, MBD4, MEN1, MET, MITF, MLH1, MSH2, MSH3, MSH6, MUTYH, NF1, NF2, NTHL1, PALB2, PDGFRA, PHOX2B, PMS2,  POLD12, POLE, POT1, SDVUH9V, PTCH1, PTEN, RAD51C, RAD51D, RB1, RET, RUNX1, SDHA, SDHAF2, SDHB, SDHC, SDHD, SMAD4, SMARCA4, SMARCB1, SMARCE1, STK11, SUFU, BDMO134, TP53, TSC1, TSC2, WT1, VHL with Limited Evidence Genes (9 genes): ATRIP, EGLN1, KIF1B, MLH3, PALLD, RAD51B, RNF43, RPS20, TERT  and Pancreatitis Genes (5 genes): CFTR, CPA1, CTRC, PRSS1, SPINK1.    Results: Pathogenic mutation in the CHEK2 gene (c.1100delC). Variant of uncertain significance in NTHL1 (p.H52Y)        DISCUSSION:    We discussed that her genetic test results identified a HETEROZYGOUS PATHOGENIC MUTATION (also called harmful changes) in the CHEK2 gene located at c.1100delC. This variant is consistent with a predisposition to, or diagnosis of, CHEK2 related conditions.  One reason Ms. Román Rosales underwent genetic testing was to better understand her cancer risks and to help determine if further screening or surgery is indicated.  A pathogenic mutation in CHEK2 does not explain her personal history ofendometrial cancer, but it is possible it contributed to the breast cancer in her mother.    CHEK2 Cancer Risks  The lifetime risk of breast cancer in female heterozygotes is 23-27% (per NCCN). The general population risk to develop breast cancer is 10-12%. Contralateral breast cancer 10-year cumulative risk is 6-8%.  Previous studies have also suggested that both men and women with the CHEK2 mutations are at increased risk for colorectal cancer.  More recent studies have stated that there is NO increased risk for colorectal cancer for those with a CHEK2 mutation, and this is reflected in current NCCN guidelines. Ms. Román Rosales should continued to be followed by her personal and family history of cancer.  Other cancers, such as prostate cancer, have also been associated with CHEK2 mutations, but there is limited data.  CHEK2 mutations have been identified in patients with prostate, male breast, thyroid, hematological malignancies, and renal  cancer, however risk estimates have not been defined. There may be additional cancer risks that we are unaware of today, as our understanding about the cancers associated with CHEK2 mutations is still growing. We encourage Ms. Román Roasles to check in with us 1-2 to see if there is updated information about this variant.    Screening Guidelines  Current screening guidelines from the National Comprehensive Cancer Network (NCCN) for those with a pathogenic CHEK2 variant are as follows:  Breast Cancer Screening:  Annual mammogram beginning at 40 years of age, with consideration of annual breast MRI with contrast every year starting at age 30-35 or 5-10 years before the earliest diagnosis of breast cancer in the family (whichever comes first).   There is insufficient evidence of risk-reducing mastectomy for carriers of a pathogenic CHEK2 variants, but this procedure may be considered based on family history.  A referral for Ms. Román Rosales to be seen at the  high risk breast clinic was offered and accepted. The referral was placed today.  Prostate Cancer Screening:  As discussed above, there is emerging evidence for the association of CHEK2 mutations with prostate cancer. Males with a pathogenic CHEK2 mutation may consider prostate cancer screening starting at age 40. While this does not apply to Dayanara, this could be impactful for male relatives like her son and brother.    Implications for Relatives  We reviewed the chance that your other relatives could also have inherited the CHEK2 mutation.   Each of your first-degree relatives have a 1 in 2 (50% chance) to also have inherited this mutation.  This includes your parents, siblings, and children.  Even though your parents have not had testing, it is most likely that you inherited the CHEK2 mutation from your mother, given her history breast cancer.  A family letter explaining this finding and how Ms. Román Rosales's relatives can receive their own genetic  testing will be sent to Ms. Román Rosales.      Resources    We discussed the patient organization known as [employer], or Facing Hereditary Cancer Empowered.   [employer] is an organization for patients and their families who have hereditary cancer in their families.  Their website is [email or web address]    Other Cancer Risks  Aside from the CHEK2 mutation, a genetic cause for Dayanara Rosales's personal history of endometrial cancer and family history of pancreatic cancer has not been identified. This could be for several reasons.  The cancer in the family may not be due to a single-gene cause. The vast majority of cancer is sporadic or familial.  There could be a mutation causing pancreatic cancer in the family that the patient did not inherit.  There could be a mutation in one of the tested genes that cannot be found with current testing methods.  There could be a mutation in a gene that has not yet been linked to hereditary cancer or was not tested.    Endometrial Cancer Risk   One reason Ms. Román Rosales underwent genetic testing was to determine if there may be a gene mutation that would explain her history of endometrial cancer and inform future cancer risks. Dayanara should continue to follow her oncology care team's recommendations for treatment and cancer screenings.    Other Cancer Risks  Dayanara Rosales's cancer risk are also shaped by her personal and family history of cancer.   We discussed that Ms. Román Rosales should follow their primary care providers' recommendations for all other age-related cancer screenings, such as mammograms, pap smears, colonoscopies, etc.  Dayanara Rosales also reported a family history of pancreatic cancer. While it is reassuring that no mutations were detected in the tested genes, Dayanara remains at an increased risk to develop pancreatic cancer based on the family history alone.  Recent studies have found that individuals with a first degree relative  with pancreatic cancer have a two-fold increased risk to develop pancreatic cancer (PMID: 63677789). Risks for pancreatic cancer are increased further with more than one affected FDR (PMID: 48274584).  At this time, there are no standard or routine pancreatic cancer screening options that are generally recommended, except for those with an especially increased risk due to a genetic predisposition or multiple relatives with pancreatic cancer.   - For individuals considering pancreatic cancer screening, they should consider contrast-enhanced MRI/MRCP and/or endoscopic ultrasound every year starting at age 50, or 10 years before the earliest known pancreatic cancer in the family (whichever comes first).   - We discussed screening through the [facility] research program. As such, referral to the [facility] recruitment team was discussed, offered, and accepted.  Dayanara Rosales may also be at an increased risk for the other cancers in the family, such as skin cancer and CLL. Dayanara Rosales was encouraged to speak to their PCP about the family history of cancer.      Implications for Family Members  [place] female relatives are considered to have an increased risk to develop endometrial cancer over the general population, based on the family history alone.   One study found that those with a first degree relative with uterine cancer (like your children) have about 1.81-fold elevated compared to the average risk (PMID: 76111029).   One study found that those with a second degree with uterine cancer have a 1.22-fold elevated risk above the general population (PMID: 68271640).  At this time, we have no good screening for endometrial cancers.   We would encourage your female family members to have regular (yearly) gynecologic exams and make sure that their gynecologist is aware of the family history. Additionally, it is encouraged that your female family members promptly report any concerning symptoms to their providers  "(such as dysfunctional uterine bleeding, bloating, feeling full early, etc.).     In addition to the CHEK2 mutation, Dayanara Rosales's siblings are still candidates for genetic testing themselves, as the family history of pancreatic cancer remains unexplained.   If her relatives are local, we would be glad to see them here at .  They can call [phone], option 1, to schedule an appointment.  For those who live outside the [place] area, they may find a genetics specialist in their area by visiting the National Society for Genetic Counselors website at: <http://www.nsgc.org/> under \"Find a Genetic Counselor,\" with \"Cancer\" specified under specialty area.       We encourage Dayanara Rosales to let us know if other relatives pursue genetic testing and/or if they are able to obtain genetic test results for any paternal or maternal relatives, as this information could impact our understanding of cancer risk for Dayanara Rosales and other relatives.  .    There may be additional cancer risks that we are unaware of today, as our understanding about the cancers associated with CHEK2 mutations is still growing. We encourage Ms. Román Rosales to check in with us every few years to see if there is updated information about this variant. Also, Ms. Román Rosales was asked to keep us informed of any changes to their personal and/or family history of cancer, and to contact us if there have been any changes since our discussion today.      {BORAIGNOFF:[id number]}    " Románaishwarya Rosales to check in with us every 1 to 2 years to see if there is updated information about this variant. Also, Ms. Román Rosales was asked to keep us informed of any changes to their personal and/or family history of cancer, and to contact us if there have been any changes since our discussion today.        A referral to the high risk breast program was placed today given Ms Emeli Rosales's CHEK2 mutation which confers an elevated risk for breast cancer.  Ms. Rosales declined a referral to the [facility] program for pancreatic cancer screening. She was encouraged to contact me in the future if she is interested in screening.  Ms. Rosales should return to the [facility] every 1-2 years to see if there is any updated information about her CHEK2 variant and if there are any updates to her VUS.  She will be mailed a copy of her results and a family letter  Ms. Rosales was encouraged to call me at [phone] if there are any questions or concerns since our discussion today.    Pearl Miner MS,   Licensed Genetic Counselor  [facility]  Phone: [phone]     Total time spent on day of encounter: 44 minutes (15 minutes with patient, 29 minutes on pre/post patient care activities, including documentation).    An virtual (video and audio) between the patient (at the originating site) and provider (at the distant site) was utilized to provide this telehealth service. Verbal consent was requested and obtained from Dayanara Rosales on this date, March 19, 2025 for a telehealth visit.

## 2025-03-19 ENCOUNTER — APPOINTMENT (OUTPATIENT)
Dept: GENETICS | Facility: CLINIC | Age: 60
End: 2025-03-19
Payer: COMMERCIAL

## 2025-03-19 DIAGNOSIS — Z80.3 FAMILY HISTORY OF BREAST CANCER: ICD-10-CM

## 2025-03-19 DIAGNOSIS — Z80.8 FAMILY HISTORY OF SKIN CANCER: ICD-10-CM

## 2025-03-19 DIAGNOSIS — Z80.0 FAMILY HISTORY OF PANCREATIC CANCER: ICD-10-CM

## 2025-03-19 DIAGNOSIS — Z71.83 ENCOUNTER FOR NONPROCREATIVE GENETIC COUNSELING: Primary | ICD-10-CM

## 2025-03-19 DIAGNOSIS — Z80.6 FAMILY HISTORY OF CLL (CHRONIC LYMPHOID LEUKEMIA): ICD-10-CM

## 2025-03-19 DIAGNOSIS — Z15.89 CHEK2 GENE MUTATION POSITIVE: ICD-10-CM

## 2025-03-19 DIAGNOSIS — C54.1 ENDOMETRIAL ADENOCARCINOMA (MULTI): ICD-10-CM

## 2025-03-19 PROCEDURE — 96041 GENETIC COUNSELING SVC EA 30: CPT

## 2025-04-03 ENCOUNTER — OFFICE VISIT (OUTPATIENT)
Dept: URGENT CARE | Age: 60
End: 2025-04-03
Payer: COMMERCIAL

## 2025-04-03 VITALS
RESPIRATION RATE: 18 BRPM | OXYGEN SATURATION: 96 % | SYSTOLIC BLOOD PRESSURE: 131 MMHG | HEART RATE: 64 BPM | DIASTOLIC BLOOD PRESSURE: 84 MMHG | TEMPERATURE: 97.8 F

## 2025-04-03 DIAGNOSIS — H69.93 EUSTACHIAN TUBE DYSFUNCTION, BILATERAL: Primary | ICD-10-CM

## 2025-04-03 DIAGNOSIS — R09.81 SINUS CONGESTION: ICD-10-CM

## 2025-04-03 PROCEDURE — 3079F DIAST BP 80-89 MM HG: CPT | Performed by: NURSE PRACTITIONER

## 2025-04-03 PROCEDURE — 3075F SYST BP GE 130 - 139MM HG: CPT | Performed by: NURSE PRACTITIONER

## 2025-04-03 PROCEDURE — 99213 OFFICE O/P EST LOW 20 MIN: CPT | Performed by: NURSE PRACTITIONER

## 2025-04-03 RX ORDER — AMOXICILLIN AND CLAVULANATE POTASSIUM 875; 125 MG/1; MG/1
1 TABLET, FILM COATED ORAL 2 TIMES DAILY
Qty: 20 TABLET | Refills: 0 | Status: SHIPPED | OUTPATIENT
Start: 2025-04-03 | End: 2025-04-13

## 2025-04-03 NOTE — PROGRESS NOTES
SUBJECTIVE:   Carrie Rosales is a 59 y.o. female who complains of congestion, nasal blockage, post nasal drip, and Bilat ear pressure and dizziness on occasion when moving head, changing postition  for 4 days. She denies a history of chest pain, shortness of breath, weakness, and sputum production and denies a history of asthma. Patient denies smoke cigarettes.     OBJECTIVE:  ENT:  General: Vitals noted, no distress, afebrile. Normal phonation, no stridor, no trismus  ENT: TMs clear effusion bilaterally, EACs unremarkable. Mastoids nontender. Posterior oropharynx without erythema, exudate, or swelling. Uvula is in the midline and non-edematous. No Aneudy's angina.  Neck: Supple. No meningismus through full range of motion. No lymphadenopathy.   Cardiac: Regular rate and rhythm, no murmur.  Lungs: Good aeration throughout. No adventitious breath sounds.   Abdomen: Soft, nontender, nonsurgical throughout. Normoactive bowel sounds.   Extremities: No peripheral edema  Skin: No rash  Neuro: No focal neurologic deficits. NIH score is 0.     ASSESSMENT:   Eustachian tube dysfunction  Sinus congestion    PLAN:  As discussed there is no ear infection. Your symptoms appear consistent with eustachian tube dysfunction vs viral URI  To help the eustachian tubes open and drain, I recommend use of:  -- Antihistamines like Claritin or Zyrtec  -- Flonase to reduce nasal congestion  -- Decongestants like sudafed to dry out fluid  Providing antibiotic- advising to hold off taking for another week, if symptoms not improving can start Amoxicillin-clavulanate  If not improving follow up with PCP

## 2025-04-03 NOTE — PATIENT INSTRUCTIONS
As discussed there is no ear infection. Your symptoms appear consistent with eustachian tube dysfunction vs viral URI  To help the eustachian tubes open and drain, I recommend use of:  -- Antihistamines like Claritin or Zyrtec  -- Flonase to reduce nasal congestion  -- Decongestants like sudafed to dry out fluid  Providing antibiotic- advising to hold off taking for another week, if symptoms not improving can start Amoxicillin-clavulanate  If not improving follow up with PCP   May 10, 2019       Kasia Del Real MD  [address]   [address]  [facility] [address]  VIA In Basket      Patient: Griselda Flores   YOB: 1956   Date of Visit: 5/10/2019       Dear Dr. Del Real:    I saw your patient, Griselda Flores, for an evaluation. Below are my notes for this visit with her.    If you have questions, please do not hesitate to call me.      Sincerely,        Lacie Wilkerson CNP        CC: No Recipients  Lacie Wilkerson CNP  5/10/2019 10:44 AM  Sign at close encounter    Vitals:   Subjective   Patient ID: Griselda is a 63 year old female.      HPI Patient is here today s/p Hospital admission on 3/17/19 through 3/20/19 for diverticulitis with pericolic abscess. Underwent CT guided aspiration of abscess and was treated with  IV ceftriaxone and Flagyl, IV fluids, and a course of oral ABX on discharge.   States she is feeling much better. Tolerating solids, formed stool. She does have chronic constipation.  No F/C, no abdominal pain, no diarrhea, no hematochezia, no melena, no appetite loss, no wt loss, no N/V  Seen by surgery, Dr Curarn,  with a tentative plan of sigmoid resection 2019.    Labs s/p hospital admission, No white count, no anemia    EGD & colonoscopy:  Patient: H FLORES, GRISELDA MRN: [id number] FIN: [id number]  Age: 61 years Sex: FEMALE : 56  Associated Diagnoses: None  Author: FRANKEL-MD, JENNIFER H  Operation Performed: EGD with biopsies  Endoscopist: Dr. Jennifer Frankel  Assistants: None  Preoperative diagnosis: Epigastric pain  Postoperative diagnosis:  1. Esophagus: 2 cm hiatal hernia, otherwise normal esophagus, z line at 33 cm.  2. Stomach: Mild, patchy erythema. Multiple random cold forceps biopsies taken  3. Duodenum: Normal, multiple random cold forceps biopsies taken  Medications: MAC  Technique: After obtaining informed consent including discussion of the risks, benefits,  alternatives to the procedure (including the potentially life threatening risks of  bleeding,  perforation, missed lesions and sedation), and after completion of a time out, the patient was  placed in the left lateral decubitus position and begun on continuous blood pressure, pulse  oximetry, and EKG monitoring, and this was maintained throughout the procedure.  Once an adequate level of sedation was achieved a bite block was placed. I then inserted the  lubricated tip of the Olympus diagnostic video upper endoscope. I passed this using direct  visualization into the esophagus, stomach, and small bowel to the extent of the second portion  of the duodenum. Retroflexion was performed in the fundus. The instrument was then withdrawn  from the patient. The views throughout the procedure were good and the patient tolerated the  procedure well. There were no complications. Estimated blood loss-none and no blood products  were given. No implants.  Recommendations:  1. Follow up on biopsies  2. Continue ppi as needed  Signature Line  Electronically Signed On 2017 08:35  Result Type Operative Report  Result Date 26 May 2017 08:34  Result Status Auth (Verified)  Result Title Dr. Frankel's EGD report  Performed by: FRANKEL-MD, JENNIFER H on 26 May 2017 08:35  Verified by: FRANKEL-MD, JENNIFER H on 26 May 2017 08:35  Encounter Info: [id number], [facility], Outpatient, 2017 - 2017  H FLORES, GRISELDA  MRN: [id number] :1956  Operative Report  Report generated at 05/10/2019 10:16 Report requested by [email or web address]  Page 1  Patient: H FLORES, GRISELDA MRN: [id number] FIN: [id number]  Age: 61 years Sex: FEMALE : 56  Associated Diagnoses: None  Author: FRANKEL-MD, JENNIFER H  Operation Performed: Colonoscopy with biopsies, injection, snare polypectomy and control of  bleeding  Endoscopist: Dr. Jennifer Frankel  Assistants: None  Preoperative diagnosis: Constipation, history of polyps-last colonoscopy more than 3 years ago  per patients  Postoperative diagnosis:  1. Extensive  diverticulosis, particularly in the sigmoid colon  2. 1.5 cm sessile sigmoid polyp s/p saline lift and snare cautery polypectomy, there was a  small amount of oozing at the post polypectomy site which stopped with the placement of one  hemoclip  3. One 4 mm sessile descending colon polyp s/p cold forceps polypectomy  4. Internal hemorrhoids  5. Normal terminal ileum  Medications: MAC  Technique: After obtaining informed consent including discussion of the risks, benefits,  alternatives (including the potentially life threatening risks of bleeding, perforation,  missed lesions and sedation) to the procedure, and after completion of a time out, the patient  was placed in the left lateral decubitus position and begun on continuous blood pressure,  pulse oximetry, and EKG monitoring, and this was maintained throughout the procedure.  Once an adequate level of sedation was achieved a digital rectal examination was performed,  and this was normal. I then inserted the lubricated tip of the Olympus colonoscope and  advanced it without difficulty to the terminal ileum. This was confirmed by visualization and  photodocumentation of the ileum, appendiceal orifice and ileocecal valve. On entering the  ileum, withdrawal was begun; withdrawal time was approximately 15 minutes. Retroflexion was  performed in the rectum. The quality of the bowel preparation was good. The patient tolerated  the procedure well. There were no complications.Estimated blood loss-none. No blood products  were given. No implants.  Recommendations:  1. Follow up on biopsies  2. Timing of next colonoscopy pending pathology     Path:  A: Duodenal; biopsy:  - Duodenal mucosa without histologic abnormality  B: Gastric; biopsy:  - Gastric oxyntic mucosa with proton pump inhibitor effect  - Immunostain for Helicobacter pylori is negative  C: Descending colon polyp; polypectomy:  - Colonic mucosa with a small lymphoid aggregate  D: Sigmoid polyp; polypectomy:  -  Polypoid portion of colonic mucosa with ulceration, mixed inflammation, and  granulation tissue formation  - Fragments of stool  Manpreet Fenton MD  ** Electronic Signature (CMV) 2017 09:16 **      Griselda has a past medical history of Arthritis, Carpal tunnel syndrome, Fibromyalgia, GERD (gastroesophageal reflux disease), Glaucoma (increased eye pressure), Glaucoma suspect, bilateral (2016), Hypokalemia (3/29/2019), Insomnia (2016), Neuromuscular disorder (CMS/[facility]), Obesity (2018), Osteopetrosis, and Rheumatoid arthritis (CMS/[facility]).  Griselda has Diverticulitis on their problem list.  Griselda has a past surgical history that includes  section, classic and IR ASPIRATION ABSCESS/HEMATOMA/CYST (2019).  Her family history includes Cancer in her mother; Cancer, Prostate in her father; Heart disease in her mother.  Griselda reports that she has never smoked. She has never used smokeless tobacco. She reports that she does not drink alcohol or use drugs.  Griselda has a current medication list which includes the following prescription(s): trazodone, baclofen, celecoxib, gabapentin, metoclopramide, metronidazole, neomycin, and metronidazole.  Griselda   Current Outpatient Medications   Medication Sig Dispense Refill   • traZODone (DESYREL) 50 MG tablet Take 1 tablet by mouth at bedtime. 90 tablet 0   • baclofen (LIORESAL) 10 MG tablet ONE Q HS     • celecoxib (CELEBREX) 200 MG capsule Take 1 capsule by mouth daily.     • gabapentin (NEURONTIN) 600 MG tablet take on tab in AM, one tab in the faternoon and 2 tab at night     • metoCLOPramide (REGLAN) 10 MG tablet Take 2 tablets p.o. 2 tablet 0   • metroNIDAZOLE (FLAGYL) 500 MG tablet Take 1 tablet p.o. 2 tablet 0   • neomycin (MYCIFRADIN) 500 MG tablet Take 2 tabs p.o. 4 tablet 0   • metroNIDAZOLE (FLAGYL) 500 MG tablet Take 1 tablet by mouth every 8 hours. 21 tablet 0     No current facility-administered medications for this visit.       Griselda has No Known Allergies.    Review of Systems   Constitutional: Negative for activity change.   HENT: Negative for mouth sores, sore throat, trouble swallowing and voice change.    Eyes: Negative for pain.   Respiratory: Negative for apnea and shortness of breath.    Cardiovascular: Negative for chest pain.   Genitourinary: Negative for difficulty urinating.   Musculoskeletal: Negative for back pain and joint swelling.   Skin: Negative for color change and rash.   Neurological: Negative for dizziness, weakness and light-headedness.   Hematological: Does not bruise/bleed easily.       Objective   There were no vitals taken for this visit.  Physical Exam   Constitutional: She is oriented to person, place, and time. She appears well-developed and well-nourished.   HENT:   Head: Normocephalic and atraumatic.   Eyes: Pupils are equal, round, and reactive to light. No scleral icterus.   Cardiovascular: Normal rate, regular rhythm and normal heart sounds.   Pulmonary/Chest: Effort normal and breath sounds normal.   Abdominal: Soft. Normal appearance and bowel sounds are normal. She exhibits no distension and no mass. There is tenderness in the left lower quadrant. There is no guarding. No hernia.   Musculoskeletal: She exhibits no edema.   Lymphadenopathy:        Head (right side): No submandibular adenopathy present.        Head (left side): No submandibular adenopathy present.     She has no cervical adenopathy.   Neurological: She is alert and oriented to person, place, and time. Gait normal.   Skin: Skin is warm and dry.   Psychiatric: She has a normal mood and affect. Her mood appears not anxious.       Assessment   Problem List Items Addressed This Visit     None      Visit Diagnoses     History of diverticular abscess of colon    -  Primary    Relevant Orders    CT ABDOMEN PELVIS W CONTRAST      Griselda Flores 63y F with a history of Fibromyalgia, here today s/p a hospitalization for diverticulitis with  abscess March 17 - 20, 2019. States she is feeling better, is tolerating solids, normal BM, history constipation.   Denies abdominal pain, but on physical exam she is tender in LLQ. No F/C. Due to the tenderness LLQ will order a f/u CT scan r/o active infection, assess resolution of abscess. If CT negative, proceed with colonoscopy.   She has a tentative surgical date of 6/11/2019, for sigmoid resection. Here today for f/u colonoscopy.   1) diverticulitis, Tender LLQ on exam      -- CT abdomen and pelvis with contrast r/o active infection, s/p drainage of abscess      -- If CT Neg, will schedule colonoscopy   2) History constipation        Recommend avoid constipation and straining to decrease risk of perforation.       -- stool softener daily      --  Recommend Miralax 17g once or twice a day. Goal is to have one soft formed BM daily. Increase fiber in diet, adequate water intake, and increase physical activity  pt educated on colonoscopy, rationale for procedure explained & R/B/A  Risks including potentially life threatening risk of bleeding, infection, perforation, missed lesions, and sedation explained  questions answered, verbalizes understanding and agrees to proceed  Instructed on bowel prep by staff MA, Rx sent, printed info given to pt  Thank you for the opportunity to participate in the care of this patient   Discussed with Dr Christianson.

## 2025-05-03 ENCOUNTER — OFFICE VISIT (OUTPATIENT)
Dept: URGENT CARE | Age: 60
End: 2025-05-03
Payer: COMMERCIAL

## 2025-05-03 VITALS
RESPIRATION RATE: 16 BRPM | OXYGEN SATURATION: 97 % | DIASTOLIC BLOOD PRESSURE: 79 MMHG | SYSTOLIC BLOOD PRESSURE: 119 MMHG | HEART RATE: 78 BPM | TEMPERATURE: 98 F

## 2025-05-03 DIAGNOSIS — R50.9 FEVER, UNSPECIFIED FEVER CAUSE: ICD-10-CM

## 2025-05-03 DIAGNOSIS — R35.0 URINARY FREQUENCY: ICD-10-CM

## 2025-05-03 DIAGNOSIS — J06.9 VIRAL URI: Primary | ICD-10-CM

## 2025-05-03 LAB
POC APPEARANCE, URINE: CLEAR
POC BILIRUBIN, URINE: NEGATIVE
POC BLOOD, URINE: NEGATIVE
POC COLOR, URINE: ABNORMAL
POC GLUCOSE, URINE: NEGATIVE MG/DL
POC KETONES, URINE: NEGATIVE MG/DL
POC LEUKOCYTES, URINE: NEGATIVE
POC NITRITE,URINE: NEGATIVE
POC PH, URINE: 6 PH
POC PROTEIN, URINE: ABNORMAL MG/DL
POC SPECIFIC GRAVITY, URINE: 1.02
POC UROBILINOGEN, URINE: 0.2 EU/DL

## 2025-05-03 ASSESSMENT — ENCOUNTER SYMPTOMS
SHORTNESS OF BREATH: 0
WHEEZING: 0
FREQUENCY: 1
CHEST TIGHTNESS: 0
ABDOMINAL PAIN: 0
COUGH: 1
DIARRHEA: 0
BLOOD IN STOOL: 0
DYSURIA: 0
HEMATURIA: 0
VOMITING: 0
FEVER: 1
SORE THROAT: 1

## 2025-05-03 NOTE — PROGRESS NOTES
"Subjective   Patient ID: Dayanara Brantley \"Shaniqua" is a 59 y.o. female. They present today with a chief complaint of UTI.    History of Present Illness  Patient presents for evaluation of a fever.  She completed a course of Augmentin in early April for sinus type symptoms.  She did also develop diarrhea after that that finally resolved.  She notes she was starting to feel better but then developed a fever last night of 101.  She has been using over-the-counter Tylenol and ibuprofen to control her fever.  Patient was worried she may have a urinary tract infection as she developed 1 after hysterectomy in January.  She has had slight increase urinary frequency.  She denies any dysuria, hematuria, vaginal discharge.  She has also had sinus congestion that began Thursday including postnasal drainage, sore throat, cough and ongoing bilateral ear pressure.  Patient did have hysterectomy due to uterine cancer.  She does have a follow-up appointment with her oncologist in June.  She denies any ear pain, chest tightness, wheezing, shortness of breath, vomiting or diarrhea.      UTI  Associated symptoms: congestion, cough, fever and sore throat    Associated symptoms: no abdominal pain, no diarrhea, no ear pain (Bilateral ear pressure), no shortness of breath, no vomiting and no wheezing        Past Medical History  Allergies as of 05/03/2025    (No Known Allergies)       Prescriptions Prior to Admission[1]     Medical History[2]    Surgical History[3]     reports that she has never smoked. She has never been exposed to tobacco smoke. She has never used smokeless tobacco. She reports that she does not currently use alcohol after a past usage of about 1.0 - 2.0 standard drink of alcohol per week. She reports that she does not use drugs.    Review of Systems  Review of Systems   Constitutional:  Positive for fever.   HENT:  Positive for congestion, postnasal drip and sore throat. Negative for ear discharge and ear pain " (Bilateral ear pressure).    Respiratory:  Positive for cough. Negative for chest tightness, shortness of breath and wheezing.    Gastrointestinal:  Negative for abdominal pain, blood in stool, diarrhea and vomiting.   Genitourinary:  Positive for frequency. Negative for dysuria, hematuria and vaginal discharge.                                  Objective    Vitals:    05/03/25 0820   BP: 119/79   Pulse: 78   Resp: 16   Temp: 36.7 °C (98 °F)   SpO2: 97%     No LMP recorded. Patient is postmenopausal.    Physical Exam  Vitals reviewed.   Constitutional:       General: She is not in acute distress.     Appearance: She is not ill-appearing.   HENT:      Right Ear: Tympanic membrane and ear canal normal.      Left Ear: Tympanic membrane and ear canal normal.      Nose: Congestion present. No rhinorrhea.      Mouth/Throat:      Mouth: Mucous membranes are moist.      Pharynx: No oropharyngeal exudate or posterior oropharyngeal erythema.   Cardiovascular:      Rate and Rhythm: Normal rate and regular rhythm.      Heart sounds: Normal heart sounds.   Pulmonary:      Effort: Pulmonary effort is normal. No respiratory distress.      Breath sounds: Normal breath sounds. No wheezing, rhonchi or rales.   Abdominal:      General: Abdomen is flat. Bowel sounds are normal.      Palpations: Abdomen is soft.      Tenderness: There is no right CVA tenderness, left CVA tenderness, guarding or rebound.   Lymphadenopathy:      Cervical: No cervical adenopathy.         Procedures    Point of Care Test & Imaging Results from this visit  Results for orders placed or performed in visit on 05/03/25   POCT UA Automated manually resulted   Result Value Ref Range    POC Color, Urine Light-Yellow Straw, Yellow, Light-Yellow    POC Appearance, Urine Clear Clear    POC Glucose, Urine NEGATIVE NEGATIVE mg/dl    POC Bilirubin, Urine NEGATIVE NEGATIVE    POC Ketones, Urine NEGATIVE NEGATIVE mg/dl    POC Specific Gravity, Urine 1.020 1.005 - 1.035     POC Blood, Urine NEGATIVE NEGATIVE    POC PH, Urine 6.0 No Reference Range Established PH    POC Protein, Urine TRACE (A) NEGATIVE mg/dl    POC Urobilinogen, Urine 0.2 0.2, 1.0 EU/DL    Poc Nitrite, Urine NEGATIVE NEGATIVE    POC Leukocytes, Urine NEGATIVE NEGATIVE      Imaging  No results found.    Cardiology, Vascular, and Other Imaging  No other imaging results found for the past 2 days      Diagnostic study results (if any) were reviewed by Serena Mulligan PA-C.    Assessment/Plan   Allergies, medications, history, and pertinent labs/EKGs/Imaging reviewed by Serena Mulligan PA-C.     Medical Decision Making  MDM: History and examination consistent with viral URI with increased urinary frequency. Urinalysis was negative for leukocytes, nitrites and blood. Urine sent for culture for further evaluation. Advised we will reach out if bacterial growth to initiate antibiotics. Reassured there are no signs of pneumonia, sinusitis, otitis or other bacterial etiology. Plan at this time is supportive measures and symptomatic care at home with use of Tylenol and ibuprofen as needed.  We did discuss to follow-up with her primary care provider or her oncologist should her fevers continue and other symptoms not worsen. Patient verbalized understanding and agrees with plan.      Orders and Diagnoses  Diagnoses and all orders for this visit:  Viral URI  Fever, unspecified fever cause  -     POCT UA Automated manually resulted  -     Urine Culture  Urinary frequency  -     Urine Culture      Medical Admin Record      Patient disposition: Home    Electronically signed by Serena Mulligan PA-C  8:37 AM           [1] (Not in a hospital admission)   [2]   Past Medical History:  Diagnosis Date    Arthritis     Depression, unspecified     Dorsopathy, unspecified     Back problem    Endometrial cancer (Multi)     seen by Dr. Shantel Motley on 10/24/24    Essential (primary) hypertension     Gastro-esophageal reflux disease without  esophagitis     Headache, unspecified     Hepatic steatosis     Noted 9/2024 on US Pelvis--Suspected hepatic steatosis    Hiatal hernia     IBS (irritable bowel syndrome)     MORGAN (nonalcoholic steatohepatitis)     RJ (obstructive sleep apnea)     No CPAP    Palpitations     Panic disorder (episodic paroxysmal anxiety)     Pelvic kidney     Postmenopausal bleeding     Vision loss     Wears corrective lens    Vitamin D deficiency, unspecified    [3]   Past Surgical History:  Procedure Laterality Date    CHOLECYSTECTOMY      Laparoscopic    COLONOSCOPY      ENDOMETRIAL BIOPSY      TONSILLECTOMY  02/25/2014

## 2025-05-06 LAB — BACTERIA UR CULT: ABNORMAL

## 2025-05-08 ENCOUNTER — TELEPHONE (OUTPATIENT)
Dept: URGENT CARE | Age: 60
End: 2025-05-08

## 2025-05-08 DIAGNOSIS — R35.0 URINARY FREQUENCY: Primary | ICD-10-CM

## 2025-05-08 RX ORDER — AMOXICILLIN AND CLAVULANATE POTASSIUM 875; 125 MG/1; MG/1
1 TABLET, FILM COATED ORAL 2 TIMES DAILY
Qty: 10 TABLET | Refills: 0 | Status: SHIPPED | OUTPATIENT
Start: 2025-05-08 | End: 2025-05-13

## 2025-05-08 NOTE — TELEPHONE ENCOUNTER
Culture results for staph epidermidis, colonizer of genitalia but patient is symptomatic. Will treat with 5 day treatment at this time and advise follow up with primary care provider.

## 2025-06-09 ENCOUNTER — OFFICE VISIT (OUTPATIENT)
Dept: CARDIOLOGY | Facility: CLINIC | Age: 60
End: 2025-06-09
Payer: COMMERCIAL

## 2025-06-09 ENCOUNTER — APPOINTMENT (OUTPATIENT)
Dept: PRIMARY CARE | Facility: CLINIC | Age: 60
End: 2025-06-09
Payer: COMMERCIAL

## 2025-06-09 VITALS
SYSTOLIC BLOOD PRESSURE: 137 MMHG | HEIGHT: 64 IN | WEIGHT: 191 LBS | HEART RATE: 77 BPM | DIASTOLIC BLOOD PRESSURE: 83 MMHG | BODY MASS INDEX: 32.61 KG/M2

## 2025-06-09 VITALS
HEART RATE: 64 BPM | DIASTOLIC BLOOD PRESSURE: 84 MMHG | TEMPERATURE: 98 F | SYSTOLIC BLOOD PRESSURE: 114 MMHG | OXYGEN SATURATION: 96 % | WEIGHT: 190 LBS | BODY MASS INDEX: 34 KG/M2

## 2025-06-09 DIAGNOSIS — Z23 ENCOUNTER TO VACCINATE PATIENT: ICD-10-CM

## 2025-06-09 DIAGNOSIS — E78.5 HYPERLIPIDEMIA: ICD-10-CM

## 2025-06-09 DIAGNOSIS — I10 PRIMARY HYPERTENSION: Primary | ICD-10-CM

## 2025-06-09 DIAGNOSIS — R73.03 PREDIABETES: ICD-10-CM

## 2025-06-09 DIAGNOSIS — F41.9 ANXIETY DISORDER, UNSPECIFIED TYPE: ICD-10-CM

## 2025-06-09 DIAGNOSIS — I10 HYPERTENSION, UNSPECIFIED TYPE: ICD-10-CM

## 2025-06-09 DIAGNOSIS — C54.1 ENDOMETRIAL ADENOCARCINOMA (MULTI): ICD-10-CM

## 2025-06-09 DIAGNOSIS — E78.2 MIXED HYPERLIPIDEMIA: ICD-10-CM

## 2025-06-09 DIAGNOSIS — I10 HYPERTENSION: Primary | ICD-10-CM

## 2025-06-09 DIAGNOSIS — K21.9 GASTROESOPHAGEAL REFLUX DISEASE WITHOUT ESOPHAGITIS: ICD-10-CM

## 2025-06-09 DIAGNOSIS — I51.7 LVH (LEFT VENTRICULAR HYPERTROPHY): ICD-10-CM

## 2025-06-09 DIAGNOSIS — R94.31 ABNORMAL EKG: ICD-10-CM

## 2025-06-09 DIAGNOSIS — Z01.84 IMMUNITY STATUS TESTING: ICD-10-CM

## 2025-06-09 PROBLEM — F32.A DEPRESSIVE DISORDER: Status: RESOLVED | Noted: 2025-06-09 | Resolved: 2025-06-09

## 2025-06-09 PROBLEM — R12 HEARTBURN: Status: RESOLVED | Noted: 2025-06-09 | Resolved: 2025-06-09

## 2025-06-09 PROBLEM — K21.00 GASTROESOPHAGEAL REFLUX DISEASE WITH ESOPHAGITIS WITHOUT HEMORRHAGE: Status: RESOLVED | Noted: 2023-07-24 | Resolved: 2025-06-09

## 2025-06-09 PROCEDURE — 90471 IMMUNIZATION ADMIN: CPT | Performed by: NURSE PRACTITIONER

## 2025-06-09 PROCEDURE — 99214 OFFICE O/P EST MOD 30 MIN: CPT | Performed by: NURSE PRACTITIONER

## 2025-06-09 PROCEDURE — 3079F DIAST BP 80-89 MM HG: CPT | Performed by: NURSE PRACTITIONER

## 2025-06-09 PROCEDURE — 3079F DIAST BP 80-89 MM HG: CPT | Performed by: INTERNAL MEDICINE

## 2025-06-09 PROCEDURE — 99204 OFFICE O/P NEW MOD 45 MIN: CPT | Performed by: INTERNAL MEDICINE

## 2025-06-09 PROCEDURE — 1036F TOBACCO NON-USER: CPT | Performed by: INTERNAL MEDICINE

## 2025-06-09 PROCEDURE — 1036F TOBACCO NON-USER: CPT | Performed by: NURSE PRACTITIONER

## 2025-06-09 PROCEDURE — 90715 TDAP VACCINE 7 YRS/> IM: CPT | Performed by: NURSE PRACTITIONER

## 2025-06-09 PROCEDURE — 3074F SYST BP LT 130 MM HG: CPT | Performed by: NURSE PRACTITIONER

## 2025-06-09 PROCEDURE — 3075F SYST BP GE 130 - 139MM HG: CPT | Performed by: INTERNAL MEDICINE

## 2025-06-09 PROCEDURE — 3008F BODY MASS INDEX DOCD: CPT | Performed by: INTERNAL MEDICINE

## 2025-06-09 ASSESSMENT — ENCOUNTER SYMPTOMS
DIARRHEA: 0
FATIGUE: 0
HEADACHES: 0
NUMBNESS: 0
VOMITING: 0
NAUSEA: 0
SHORTNESS OF BREATH: 0
ABDOMINAL PAIN: 0
COUGH: 0
PALPITATIONS: 0
CHEST TIGHTNESS: 0
WEAKNESS: 0
FEVER: 0
CHILLS: 0
DIZZINESS: 0
BLOOD IN STOOL: 0

## 2025-06-09 ASSESSMENT — PATIENT HEALTH QUESTIONNAIRE - PHQ9
SUM OF ALL RESPONSES TO PHQ9 QUESTIONS 1 AND 2: 0
1. LITTLE INTEREST OR PLEASURE IN DOING THINGS: NOT AT ALL
2. FEELING DOWN, DEPRESSED OR HOPELESS: NOT AT ALL

## 2025-06-09 NOTE — PROGRESS NOTES
"Chief Complaint:   Abnormal ECG     History of Present Illness     Dayanara Brantley \"Carrie\" is a 60 y.o. female I had the pleasure of evaluating in cardiology consultation with an abnormal ECG with LVH and NST wave changes preop 12/17/24 prior to hysterectomy.  She has no cardiac Hx.  She has well controlled HTN.  Exercises.  Follows heart healthy diet.  No DM. Father PPM.  No FH of CAD.  Denies CP or TOLENTINO.       Previous History     Past Medical History:  She has a past medical history of Arthritis, Depression, unspecified, Depressive disorder (06/09/2025), Dorsopathy, unspecified, Endometrial cancer (Multi), Essential (primary) hypertension, Gastro-esophageal reflux disease without esophagitis, Headache, unspecified, Heartburn (06/09/2025), Hepatic steatosis, Hiatal hernia, IBS (irritable bowel syndrome), MORGAN (nonalcoholic steatohepatitis), RJ (obstructive sleep apnea), Palpitations, Panic disorder (episodic paroxysmal anxiety), Pelvic kidney, Postmenopausal bleeding, Vision loss, and Vitamin D deficiency, unspecified.    Past Surgical History:  She has a past surgical history that includes Tonsillectomy (02/25/2014); Cholecystectomy; Endometrial biopsy; Colonoscopy; and Hysterectomy (12/31/2024).      Social History:  She reports that she has never smoked. She has never been exposed to tobacco smoke. She has never used smokeless tobacco. She reports current alcohol use of about 1.0 - 2.0 standard drink of alcohol per week. She reports that she does not use drugs.    Family History:  Family History[1]     Allergies:  Patient has no known allergies.    Outpatient Medications:  Current Outpatient Medications   Medication Instructions    amLODIPine (NORVASC) 5 mg, oral, Daily    atenolol (TENORMIN) 50 mg, oral, Daily    benazepril (LOTENSIN) 20 mg, oral, Daily    fexofenadine HCl (ALLEGRA ORAL) Take by mouth.    fluticasone propionate (FLONASE NASL) Administer into affected nostril(s).    multivit-min/ferrous " "fumarate (MULTI VITAMIN ORAL) Take by mouth. GUMMIE MV    omeprazole (PRILOSEC) 20 mg, oral, Daily    sertraline (ZOLOFT) 50 mg, oral, Daily       Physical Examination   Vitals:  Visit Vitals  /83 (BP Location: Right arm, Patient Position: Sitting, BP Cuff Size: Large adult)   Pulse 77   Ht 1.626 m (5' 4\")   Wt 86.6 kg (191 lb)   BMI 32.79 kg/m²   OB Status Postmenopausal   Smoking Status Never   BSA 1.98 m²    Physical Exam  Vitals reviewed.   Constitutional:       General: She is not in acute distress.     Appearance: Normal appearance.   HENT:      Head: Normocephalic and atraumatic.      Nose: Nose normal.   Eyes:      Conjunctiva/sclera: Conjunctivae normal.   Cardiovascular:      Rate and Rhythm: Normal rate and regular rhythm.      Pulses: Normal pulses.      Heart sounds: No murmur heard.  Pulmonary:      Effort: Pulmonary effort is normal. No respiratory distress.      Breath sounds: Normal breath sounds. No wheezing, rhonchi or rales.   Abdominal:      General: Bowel sounds are normal. There is no distension.      Palpations: Abdomen is soft.      Tenderness: There is no abdominal tenderness.   Musculoskeletal:         General: No swelling.      Right lower leg: No edema.      Left lower leg: No edema.   Skin:     General: Skin is warm and dry.      Capillary Refill: Capillary refill takes less than 2 seconds.   Neurological:      General: No focal deficit present.      Mental Status: She is alert.   Psychiatric:         Mood and Affect: Mood normal.             Labs/Imaging/Cardiac Studies   I have personally reviewed the patient's available lab work, primary care appointment notes, pertinent imaging studies, and cardiac studies and have discussed them and my independent interpretation of those results with the patient and caregiver at this appointment.  All pertinent recent Emergency Department evaluations and Hospital admissions were also reviewed in detail with the patient and " caregiver.        Echo:  Transthoracic Echo (TTE) Complete  Result Date: 12/26/2024        [facility] [place], [place] [address]        Tel [phone] and Fax [phone] TRANSTHORACIC ECHOCARDIOGRAM REPORT  Patient Name:       MIREILLE MUNOZ    Reading Physician:    [id number] Carlos Mulligan DO Study Date:         12/26/2024          Ordering Provider:    [id number] GISELA MARTINEZ MRN/PID:            [id number]            Fellow: Accession#:         [id number]        Nurse: Date of Birth/Age:  1965 / 59 years Sonographer:          Tori RAMIREZ RDCS Gender assigned at  F                   Additional Staff: Birth: Height:             162.56 cm           Admit Date:           12/26/2024 Weight:             88.45 kg            Admission Status:     Outpatient BSA / BMI:          1.94 m2 / 33.47     Encounter#:           [id number]                     kg/m2 Blood Pressure:     129/84 mmHg         Department Location:  [facility] Study Type:    TRANSTHORACIC ECHO (TTE) COMPLETE Diagnosis/ICD: Abnormal electrocardiogram [ECG] [EKG]-R94.31 Indication:    Abnormal EKG/Pre-op CPT Code:      Echo Complete w Full Doppler-91768  Study Detail: The following Echo studies were performed: 2D, M-Mode, Doppler and               color flow. Technically challenging study due to body habitus. A               bubble study was not performed.  PHYSICIAN INTERPRETATION: Left Ventricle: Left ventricular ejection fraction is normal, calculated by Oviedo's biplane at 64%. There are no regional left ventricular wall motion abnormalities. The left ventricular cavity size is normal. There is mildly increased septal and normal posterior left  ventricular wall thickness. There is left ventricular concentric remodeling. Spectral Doppler shows a normal pattern of left ventricular diastolic filling. Left Atrium: The left atrium is mildly dilated. A bubble study using agitated saline was not performed. Right Ventricle: The right ventricle is normal in size. There is normal right ventricular global systolic function. Right Atrium: The right atrium is normal in size. Aortic Valve: The aortic valve is trileaflet. There is no evidence of aortic valve regurgitation. The peak instantaneous gradient of the aortic valve is 8 mmHg. Mitral Valve: The mitral valve is mildly thickened. There is trace mitral valve regurgitation. Tricuspid Valve: The tricuspid valve is structurally normal. No evidence of tricuspid regurgitation. The right ventricular systolic pressure is unable to be estimated. Reported right ventricular systolic pressure may be underestimated due to incomplete or suboptimal Doppler envelope. Pulmonic Valve: The pulmonic valve is not well visualized. The pulmonic valve regurgitation was not well visualized. Pericardium: There is no pericardial effusion noted. Aorta: The aortic root is normal. There is no dilatation of the ascending aorta. There is no dilatation of the aortic root. Systemic Veins: The inferior vena cava appears normal in size. In comparison to the previous echocardiogram(s): There are no prior studies on this patient for comparison purposes.  CONCLUSIONS:  1. Left ventricular ejection fraction is normal, calculated by Oviedo's biplane at 64%.  2. There is normal right ventricular global systolic function.  3. The left atrium is mildly dilated. QUANTITATIVE DATA SUMMARY:  2D MEASUREMENTS:          Normal Ranges: LAs:             4.41 cm  (2.7-4.0cm) RVIDd:           2.58 cm  (0.9-3.6cm) IVSd:            1.18 cm  (0.6-1.1cm) LVPWd:           0.93 cm  (0.6-1.1cm) LVIDd:           4.21 cm  (3.9-5.9cm) LVIDs:           2.70 cm LV Mass Index:    77 g/m2 LVEDV Index:     35 ml/m2 LV % FS          35.9 %  LA VOLUME:                    Normal Ranges: LA Vol A4C:        74.7 ml    (22+/-6mL/m2) LA Vol A2C:        74.1 ml LA Vol BP:         75.1 ml LA Vol Index A4C:  38.6 ml/m2 LA Vol Index A2C:  38.3 ml/m2 LA Vol Index BP:   38.8 ml/m2 LA Area A4C:       21.2 cm2 LA Area A2C:       21.3 cm2 LA Major Axis A4C: 5.1 cm LA Major Axis A2C: 5.2 cm LA Vol A4C:        64.0 ml LA Vol A2C:        68.7 ml LA Vol Index BSA:  34.3 ml/m2  RA VOLUME BY A/L METHOD:           Normal Ranges: RA Vol A4C:              17.3 ml   (8.3-19.5ml) RA Vol Index A4C:        9.0 ml/m2 RA Area A4C:             10.3 cm2 RA Major Axis A4C:       5.2 cm  LV SYSTOLIC FUNCTION BY 2D PLANIMETRY (MOD):                      Normal Ranges: EF-A4C View:    64 % (>=55%) EF-A2C View:    66 % EF-Biplane:     64 % LV EF Reported: 64 %  LV DIASTOLIC FUNCTION:             Normal Ranges: MV Peak E:             0.67 m/s    (0.7-1.2 m/s) MV Peak A:             1.04 m/s    (0.42-0.7 m/s) E/A Ratio:             0.65        (1.0-2.2) MV e'                  0.071 m/s   (>8.0) MV lateral e'          0.09 m/s MV medial e'           0.05 m/s MV A Dur:              121.79 msec E/e' Ratio:            9.52        (<8.0)  MITRAL VALVE:          Normal Ranges: MV DT:        194 msec (150-240msec)  AORTIC VALVE:           Normal Ranges: AoV Vmax:      1.38 m/s (<=1.7m/s) AoV Peak P.6 mmHg (<20mmHg) LVOT Max Oscar:  1.04 m/s (<=1.1m/s) LVOT VTI:      20.94 cm LVOT Diameter: 2.04 cm  (1.8-2.4cm) AoV Area,Vmax: 2.46 cm2 (2.5-4.5cm2)  RIGHT VENTRICLE: RV Basal 2.99 cm RV Mid   2.58 cm RV Major 6.0 cm TAPSE:   24.9 mm RV s'    0.16 m/s  TRICUSPID VALVE/RVSP:         Normal Ranges: Est. RA Pressure:     3 mmHg IVC Diam:             1.72 cm  PULMONIC VALVE:          Normal Ranges: PV Max Oscar:     1.0 m/s  (0.6-0.9m/s) PV Max P.3 mmHg  AORTA: Asc Ao Diam 3.31 cm  [id number] Carlos Mulligan DO Electronically signed on  12/26/2024 at 6:53:24 PM  ** Final **      Reviewed ECG, Labs and Echo      Assessment and Recommendations     Assessment/Plan       1. LVH (left ventricular hypertrophy)  Based on EKG but no LVH omn her Echo. She has no structural HD.  - Referral to Cardiology    2. Hypertension (Primary)  The patient's blood pressure has been well-controlled at today's appointment or by recent primary care provider's measurements/home measurements and meets their goal blood pressure per the ACC/AHA guidelines.  The patient has been compliant with their anti-hypertensive medications and is following a low sodium/DASH diet. I advised continuation of their present medical treatment and lifestyle modification.      3. Abnormal EKG  False positive ECG.    4. Hyperlipidemia  Diet/Exercise.  Obtain calcium score. If elevated then statin.           Maximus Guerrero MD    Exclusive of any other services or procedures performed, I, Maximus Guerrero MD , spent 30 minutes in duration for this visit today.  This time consisted of chart review, obtaining history, and/or performing the exam as documented above as well as documenting the clinical information for the encounter in the electronic record, discussing treatment options, plans, and/or goals with patient, family, and/or caregiver, refilling medications, updating the electronic record, ordering medicines, lab work, imaging, referrals, and/or procedures as documented above and communicating with other [facility] professionals. I have discussed the results of laboratory, radiology, and cardiology studies with the patient and their family/caregiver.         [1]   Family History  Problem Relation Name Age of Onset    Hyperlipidemia Mother R Román     Hypertension Mother R Román     Macular degeneration Mother R Román     Breast cancer Mother R Román 51    Pancreatic cancer Mother R Román 91    Vision loss Mother R Román     Cancer Mother R Román     Hyperlipidemia Father M  Román     Hypertension Father M Román     Skin cancer Father M Román     Other (Chronic lymphocytic leukemia) Father M Román 80 - 89    Pancreatic cancer Brother J Román 64    Cancer Brother J Román

## 2025-06-09 NOTE — PROGRESS NOTES
"Subjective    Dayanara PARRY SteveRosales \"Carrie\" is a 60 y.o. female who presents for 6 mon fu.    HPI  She presents to the office today for medication refills and a 6 month follow up.   She is tolerating medications well at the current dose- no side effects. No chest pain, shortness of breath, palpitations or edema. No headaches, numbness, tingling, weakness or vision changes.  No dizziness.  Since the last visit she has had some fever with UTI and sinus infections- resolved with treatment.  Diet: Working on getting healthier  Exercise: Just started walking- 1 miles 4 days a week- plans to increase  Weight: stable  Colonoscopy due in 3/2029  Mammogram scheduled    Mood has been good. 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  No feeling sad, down, helpless or hopeless.   No SI or HI.  No excessive worry.  No anxiety.  No panic attacks.  Sleeping ok at night.    Last labs: ordered today.     Review of Systems   Constitutional:  Negative for chills, fatigue and fever.   Eyes:  Negative for visual disturbance.   Respiratory:  Negative for cough, chest tightness and shortness of breath.    Cardiovascular:  Negative for chest pain, palpitations and leg swelling.   Gastrointestinal:  Negative for abdominal pain, blood in stool, diarrhea, nausea and vomiting.   Neurological:  Negative for dizziness, weakness, numbness and headaches.     Objective   /84 (BP Location: Left arm, Patient Position: Sitting)   Pulse 64   Temp 36.7 °C (98 °F) (Temporal)   Wt 86.2 kg (190 lb)   SpO2 96%   BMI 34.00 kg/m²     Physical Exam  Constitutional:       General: She is not in acute distress.     Appearance: Normal appearance. She is not toxic-appearing.   Eyes:      Extraocular Movements: Extraocular movements intact.      Conjunctiva/sclera: Conjunctivae normal.      Pupils: Pupils are equal, round, and reactive to light.   Cardiovascular:      Rate and Rhythm: Normal rate and regular rhythm.      Pulses: Normal pulses.      " Heart sounds: Normal heart sounds, S1 normal and S2 normal. No murmur heard.  Pulmonary:      Effort: Pulmonary effort is normal. No respiratory distress.      Breath sounds: Normal breath sounds.   Abdominal:      General: Bowel sounds are normal.      Palpations: Abdomen is soft.      Tenderness: There is no abdominal tenderness.   Musculoskeletal:      Right lower leg: No edema.      Left lower leg: No edema.   Lymphadenopathy:      Cervical: No cervical adenopathy.   Neurological:      Mental Status: She is alert and oriented to person, place, and time.   Psychiatric:         Attention and Perception: Attention normal.         Mood and Affect: Mood and affect normal.         Behavior: Behavior normal. Behavior is cooperative.         Thought Content: Thought content normal.         Cognition and Memory: Cognition normal.         Judgment: Judgment normal.         Assessment/Plan   Problem List Items Addressed This Visit       Hypertension - Primary    Well controlled on current medications. Continue.         Relevant Orders    Comprehensive Metabolic Panel    CBC    Lipid Panel    Hyperlipidemia    Check updated FLP.         Relevant Orders    Comprehensive Metabolic Panel    Lipid Panel    Anxiety disorder    She has been doing well and would like to wean off the Sertraline. Advised to take Sertraline 25 mg (a half tablet) daily for one month, then stop.         Prediabetes    Check updated hemoglobin A1c. Continue to focus on a healthy diet and regular exercise.         Relevant Orders    Hemoglobin A1C    Gastroesophageal reflux disease without esophagitis    Stable on the current dose of Omeprazole.          Endometrial adenocarcinoma (Multi)    She is following with GYN oncology s/p hysterectomy.          Other Visit Diagnoses         Immunity status testing        Relevant Orders    Measles (Rubeola) Antibody, IgG      Encounter to vaccinate patient        Relevant Orders    Tdap vaccine, age 7 years and  older  (BOOSTRIX)          It has been a pleasure seeing you today!

## 2025-06-09 NOTE — PROGRESS NOTES
Patient ID: Carrie Brantley is a 60 y.o. female.  Referring Physician: No referring provider defined for this encounter.  Primary Care Provider: HAILEE Pena-CNP    Subjective    Here for surveillance. Post-operative course complicated by vaginal cuff dehiscence. Had been diagnosed with 2 UTIs since surgery. Symptoms resolved. No vaginal bleeding. No change in bowel or bladder function. Retired! Planning trip to [place] soon.     Objective    BSA: 1.95 meters squared  /84 (BP Location: Left arm, Patient Position: Sitting, BP Cuff Size: Adult)   Pulse 61   Temp 36.7 °C (98.1 °F)   Resp 16   Wt 83.9 kg (185 lb)   SpO2 96%   BMI 31.76 kg/m²      Physical Exam  Vitals and nursing note reviewed. Exam conducted with a chaperone present.   Constitutional:       Appearance: Normal appearance. She is normal weight.   HENT:      Head: Normocephalic and atraumatic.      Mouth/Throat:      Mouth: Mucous membranes are moist.   Eyes:      Extraocular Movements: Extraocular movements intact.      Conjunctiva/sclera: Conjunctivae normal.      Pupils: Pupils are equal, round, and reactive to light.   Cardiovascular:      Rate and Rhythm: Normal rate.   Abdominal:      General: Bowel sounds are normal.      Palpations: Abdomen is soft. There is no mass.      Tenderness: There is no guarding or rebound.      Hernia: No hernia is present.   Genitourinary:     General: Normal vulva.      Vagina: Normal. No vaginal discharge, erythema or lesions.      Comments: Surgically absent uterus and cervix. Vaginal cuff intact, no suture visualized.   Musculoskeletal:         General: Normal range of motion.      Cervical back: Normal range of motion and neck supple.   Skin:     General: Skin is warm.      Findings: No erythema or rash.   Neurological:      General: No focal deficit present.      Mental Status: She is alert and oriented to person, place, and time. Mental status is at baseline.   Psychiatric:         Mood  and Affect: Mood normal.         Behavior: Behavior normal.         Performance Status:  Asymptomatic    Assessment/Plan   60 y.o. 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 here for surveillance    Oncology History Overview Note   9/19/24 Pelvic US uterus 8.7 x 3.9 x 4.2 cm  EML 1.9 cm  10/10/24 EMBx Endometrial adenocarcinoma, endometrioid type, FIGO grade 1 MMRp p53wt  12/31/25 TLH BSO SLNDx noninvasive - LVI Endometrial carcinoma, endometrioid type, FIGO grade 1 MMRp p53wt c/w stage IA1       Endometrial adenocarcinoma (Multi)   10/17/2024 Initial Diagnosis    Endometrial adenocarcinoma (Multi)          Diagnoses and all orders for this visit:  Dehiscence of vaginal cuff, initial encounter  Endometrial adenocarcinoma (Multi)  S/P hysterectomy with oophorectomy     # Endometrial cancer  - No signs or symptoms of disease recurrence  - Signs/symptoms of recurrence discussed    RTC 4 months    Shantel Motley MD MPH

## 2025-06-09 NOTE — ASSESSMENT & PLAN NOTE
She has been doing well and would like to wean off the Sertraline. Advised to take Sertraline 25 mg (a half tablet) daily for one month, then stop.

## 2025-06-09 NOTE — PATIENT INSTRUCTIONS
Focus on a low sodium/low fat diet (whole grains, fresh fruits and vegetables, lean meats). Avoid foods high in sugar.  Increase exercise as tolerated.  Continue medications at the current dose.  Check fasting labs.  Tdap updated today.   Look into Shingrix coverage and Pneumonia vaccines (Prevnar 20 or Prevnar 21).   Follow up in 6 months or sooner if needed.

## 2025-06-10 LAB
ALBUMIN SERPL-MCNC: 4.5 G/DL (ref 3.6–5.1)
ALP SERPL-CCNC: 80 U/L (ref 37–153)
ALT SERPL-CCNC: 10 U/L (ref 6–29)
ANION GAP SERPL CALCULATED.4IONS-SCNC: 9 MMOL/L (CALC) (ref 7–17)
AST SERPL-CCNC: 12 U/L (ref 10–35)
BILIRUB SERPL-MCNC: 0.4 MG/DL (ref 0.2–1.2)
BUN SERPL-MCNC: 15 MG/DL (ref 7–25)
CALCIUM SERPL-MCNC: 9.5 MG/DL (ref 8.6–10.4)
CHLORIDE SERPL-SCNC: 107 MMOL/L (ref 98–110)
CHOLEST SERPL-MCNC: 263 MG/DL
CHOLEST/HDLC SERPL: 3.9 (CALC)
CO2 SERPL-SCNC: 26 MMOL/L (ref 20–32)
CREAT SERPL-MCNC: 0.7 MG/DL (ref 0.5–1.05)
EGFRCR SERPLBLD CKD-EPI 2021: 99 ML/MIN/1.73M2
ERYTHROCYTE [DISTWIDTH] IN BLOOD BY AUTOMATED COUNT: 13.1 % (ref 11–15)
EST. AVERAGE GLUCOSE BLD GHB EST-MCNC: 123 MG/DL
EST. AVERAGE GLUCOSE BLD GHB EST-SCNC: 6.8 MMOL/L
GLUCOSE SERPL-MCNC: 110 MG/DL (ref 65–99)
HBA1C MFR BLD: 5.9 %
HCT VFR BLD AUTO: 40.5 % (ref 35–45)
HDLC SERPL-MCNC: 68 MG/DL
HGB BLD-MCNC: 12.4 G/DL (ref 11.7–15.5)
LDLC SERPL CALC-MCNC: 160 MG/DL (CALC)
MCH RBC QN AUTO: 27 PG (ref 27–33)
MCHC RBC AUTO-ENTMCNC: 30.6 G/DL (ref 32–36)
MCV RBC AUTO: 88 FL (ref 80–100)
MEV IGG SER IA-ACNC: 97.5 AU/ML
NONHDLC SERPL-MCNC: 195 MG/DL (CALC)
PLATELET # BLD AUTO: 307 THOUSAND/UL (ref 140–400)
PMV BLD REES-ECKER: 12.2 FL (ref 7.5–12.5)
POTASSIUM SERPL-SCNC: 4.2 MMOL/L (ref 3.5–5.3)
PROT SERPL-MCNC: 7.5 G/DL (ref 6.1–8.1)
RBC # BLD AUTO: 4.6 MILLION/UL (ref 3.8–5.1)
SODIUM SERPL-SCNC: 142 MMOL/L (ref 135–146)
TRIGL SERPL-MCNC: 192 MG/DL
WBC # BLD AUTO: 9.6 THOUSAND/UL (ref 3.8–10.8)

## 2025-06-12 ENCOUNTER — OFFICE VISIT (OUTPATIENT)
Dept: GYNECOLOGIC ONCOLOGY | Facility: HOSPITAL | Age: 60
End: 2025-06-12
Payer: COMMERCIAL

## 2025-06-12 VITALS
WEIGHT: 185 LBS | RESPIRATION RATE: 16 BRPM | HEART RATE: 61 BPM | OXYGEN SATURATION: 96 % | TEMPERATURE: 98.1 F | SYSTOLIC BLOOD PRESSURE: 136 MMHG | BODY MASS INDEX: 31.76 KG/M2 | DIASTOLIC BLOOD PRESSURE: 84 MMHG

## 2025-06-12 DIAGNOSIS — C54.1 ENDOMETRIAL ADENOCARCINOMA (MULTI): Primary | ICD-10-CM

## 2025-06-12 DIAGNOSIS — Z08 ENCOUNTER FOR FOLLOW-UP SURVEILLANCE OF MALIGNANT NEOPLASM OF FEMALE GENITAL TRACT ORGAN: ICD-10-CM

## 2025-06-12 DIAGNOSIS — Z85.40 ENCOUNTER FOR FOLLOW-UP SURVEILLANCE OF MALIGNANT NEOPLASM OF FEMALE GENITAL TRACT ORGAN: ICD-10-CM

## 2025-06-12 PROCEDURE — 3079F DIAST BP 80-89 MM HG: CPT | Performed by: STUDENT IN AN ORGANIZED HEALTH CARE EDUCATION/TRAINING PROGRAM

## 2025-06-12 PROCEDURE — 99214 OFFICE O/P EST MOD 30 MIN: CPT | Performed by: STUDENT IN AN ORGANIZED HEALTH CARE EDUCATION/TRAINING PROGRAM

## 2025-06-12 PROCEDURE — 3075F SYST BP GE 130 - 139MM HG: CPT | Performed by: STUDENT IN AN ORGANIZED HEALTH CARE EDUCATION/TRAINING PROGRAM

## 2025-06-22 NOTE — PROGRESS NOTES
"Chief Complaint  New patient, High Risk Breast cancer surveillance    History Of Present Illness  Dayanara Brantley \"Carrie\" is a very pleasant 60 y.o. female who is a retired , presenting for a high risk breast cancer evaluation. She denies breast biopsy or surgery. 2024 s/p TLH/BSO and SLNDx for LVI endometrial carcinoma stage IA1  2025 she chose to pursue hereditary cancer testing via the 90- gene [place] panel due to her personal history of endometrial cancer, and a family history of breast, pancreatic, and skin cancer, as well as CLL. 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     BREAST IMAGIN2025 Bilateral screening mammogram, BI-RADS Category pending      REPRODUCTIVE HISTORY: menarche age 13 , , first birth age 30,  6 months, OCP's 1 year, menopause age 54, scattered breast tissue            FAMILY CANCER HISTORY:   Brother: pancreatic cancer age 64  Mother: breast cancer age 51 and  pancreatic cancer age 91  Father: unspecified skin cancer,  CLL at 88      Review of Systems  Review of Systems   Constitutional: Negative.    HENT:  Negative.     Eyes: Negative.    Respiratory: Negative.     Cardiovascular: Negative.    Gastrointestinal: Negative.    Endocrine: Negative.    Genitourinary: Negative.     Musculoskeletal:  Positive for arthralgias.   Skin: Negative.    Neurological: Negative.    Hematological: Negative.    Psychiatric/Behavioral: Negative.            Surgical History  She has a past surgical history that includes Tonsillectomy (2014); Cholecystectomy; Endometrial biopsy; Colonoscopy (Spring 2018); and Hysterectomy (2024).     Social History  She reports that she has never smoked. She has never been exposed to tobacco smoke. She has never used smokeless tobacco. She reports current alcohol use of about 1.0 - 2.0 standard drink of alcohol per week. She reports that she does not " use drugs.    Family History  Family History[1]     Allergies  Patient has no known allergies.    Past Medical History  She has a past medical history of Arthritis, Depression, unspecified, Depressive disorder (06/09/2025), Dorsopathy, unspecified, Endometrial cancer (Multi) (October 2024), Essential (primary) hypertension, Gastro-esophageal reflux disease without esophagitis, Headache, unspecified, Heartburn (06/09/2025), Hepatic steatosis, Hiatal hernia, IBS (irritable bowel syndrome), MORGAN (nonalcoholic steatohepatitis), RJ (obstructive sleep apnea), Palpitations, Panic disorder (episodic paroxysmal anxiety), Pelvic kidney, Postmenopausal bleeding, Vision loss, and Vitamin D deficiency, unspecified.     Physical Exam  Patient is alert and oriented x3 and in a relaxed and appropriate mood. Her gait is steady and hand grasps are equal. Sclera is clear. The breasts are nearly symmetrical. The tissue is soft without palpable abnormalities, discrete nodules or masses. The skin and nipples appear normal. There is no cervical, supraclavicular or axillary lymphadenopathy. Heart rate and rhythm normal, S1 and S2 appreciated. The lungs are clear to auscultation bilaterally. Abdomen is soft and non-tender.      Last Recorded Vitals  Blood pressure 119/85, pulse 67, temperature 36.6 °C (97.9 °F), temperature source Temporal, weight 86.6 kg (191 lb), SpO2 96%.    Relevant Results and Imaging      Provider Impressions    Normal clinical breast exam and imaging, CHEK2 mutation, high risk surveillance care, no history breast biopsy or surgery, family history breast cancer,scattered breast tissue    Risk Profile      Patient Discussion/Summary  Your clinical examination is normal.  Please return in 6 months for breast MRI and office visit or sooner if you have any problems or concerns.     High risk breast surveillance care plan:  Yearly mammogram with digital breast tomosynthesis  Twice yearly clinical breast  examinations  Breast MRI-  Monthly self breast examinations &/or regular self breast awareness  Vitamin D3 2000 IU/daily (over the counter) unless your PCP recommends you take a specific dose  Exercise 3-4 times per week for 45-60 minutes  Limit alcohol to 3-4 drinks per week if you are menopausal  Eat healthy low-fat diet with lots of vegetable & fruits        IMPORTANT INFORMATION REGARDING YOUR RESULTS    If you receive medical information from My [facility] Personal Health Record (online chart) your results will be released into your chart. This means you may view or see results of your biopsy or procedure before I contact you directly. If this occurs, please call the office and we will discuss your results over the phone.     You can see your health information, review clinical summaries from office visits & test results online when you follow your health with MY  Chart, a personal health record. To sign up go to [email or web address]. If you need assistance with signing up or trouble getting into your account call [facility] Patient Line 24/7 at [phone].    My office phone number is [phone] if you need to get in touch with me or have additional questions or concerns. Thank you for choosing [facility] and trusting me as your healthcare provider. I look forward to seeing you again at your next office visit. I am honored to be a provider on your health care team and I remain dedicated to helping you achieve your health goals.    Stacie Welch, APRN-CNP         [1]   Family History  Problem Relation Name Age of Onset    Hyperlipidemia Mother R Román     Hypertension Mother R Román     Macular degeneration Mother R Román     Breast cancer Mother R Román 51    Pancreatic cancer Mother R Román 91    Vision loss Mother R Román     Cancer Mother R Román     Hyperlipidemia Father M Román     Hypertension Father M Román     Skin cancer Father M Román     Other (Chronic  lymphocytic leukemia) Father M Román 80 - 89    Pancreatic cancer Brother J Román 64    Cancer Brother J Román     Cancer Brother J SOHAN [place]     Diabetes Sister Ruthie Román type 1     Diabetes Son Will Rosales type 1

## 2025-06-24 ENCOUNTER — HOSPITAL ENCOUNTER (OUTPATIENT)
Dept: RADIOLOGY | Facility: CLINIC | Age: 60
Discharge: HOME | End: 2025-06-24
Payer: COMMERCIAL

## 2025-06-24 ENCOUNTER — OFFICE VISIT (OUTPATIENT)
Dept: SURGICAL ONCOLOGY | Facility: CLINIC | Age: 60
End: 2025-06-24
Payer: COMMERCIAL

## 2025-06-24 VITALS
HEART RATE: 67 BPM | BODY MASS INDEX: 32.79 KG/M2 | SYSTOLIC BLOOD PRESSURE: 119 MMHG | DIASTOLIC BLOOD PRESSURE: 85 MMHG | WEIGHT: 191 LBS | TEMPERATURE: 97.9 F | OXYGEN SATURATION: 96 %

## 2025-06-24 DIAGNOSIS — Z15.89 CHEK2 GENE MUTATION POSITIVE: ICD-10-CM

## 2025-06-24 DIAGNOSIS — Z80.3 FAMILY HISTORY OF BREAST CANCER IN FIRST DEGREE RELATIVE: ICD-10-CM

## 2025-06-24 DIAGNOSIS — Z71.83 ENCOUNTER FOR NONPROCREATIVE GENETIC COUNSELING: ICD-10-CM

## 2025-06-24 DIAGNOSIS — Z80.0 FAMILY HISTORY OF PANCREATIC CANCER: Primary | ICD-10-CM

## 2025-06-24 DIAGNOSIS — Z80.3 FAMILY HISTORY OF BREAST CANCER: ICD-10-CM

## 2025-06-24 DIAGNOSIS — Z12.39 BREAST CANCER SCREENING, HIGH RISK PATIENT: ICD-10-CM

## 2025-06-24 PROCEDURE — 99204 OFFICE O/P NEW MOD 45 MIN: CPT

## 2025-06-24 PROCEDURE — 77063 BREAST TOMOSYNTHESIS BI: CPT | Performed by: RADIOLOGY

## 2025-06-24 PROCEDURE — 3074F SYST BP LT 130 MM HG: CPT

## 2025-06-24 PROCEDURE — 3079F DIAST BP 80-89 MM HG: CPT

## 2025-06-24 PROCEDURE — 77067 SCR MAMMO BI INCL CAD: CPT | Performed by: RADIOLOGY

## 2025-06-24 PROCEDURE — 1036F TOBACCO NON-USER: CPT

## 2025-06-24 PROCEDURE — 99214 OFFICE O/P EST MOD 30 MIN: CPT

## 2025-06-24 PROCEDURE — 77067 SCR MAMMO BI INCL CAD: CPT

## 2025-06-24 ASSESSMENT — ENCOUNTER SYMPTOMS
CARDIOVASCULAR NEGATIVE: 1
NEUROLOGICAL NEGATIVE: 1
HEMATOLOGIC/LYMPHATIC NEGATIVE: 1
EYES NEGATIVE: 1
CONSTITUTIONAL NEGATIVE: 1
RESPIRATORY NEGATIVE: 1
ENDOCRINE NEGATIVE: 1
GASTROINTESTINAL NEGATIVE: 1
ARTHRALGIAS: 1
PSYCHIATRIC NEGATIVE: 1

## 2025-06-24 ASSESSMENT — PAIN SCALES - GENERAL: PAINLEVEL_OUTOF10: 0-NO PAIN

## 2025-06-24 NOTE — PATIENT INSTRUCTIONS
Your clinical examination is normal.  Please return in 6 months for breast MRI and office visit or sooner if you have any problems or concerns.     High risk breast surveillance care plan:  Yearly mammogram with digital breast tomosynthesis  Twice yearly clinical breast examinations  Breast MRI-  Monthly self breast examinations &/or regular self breast awareness  Vitamin D3 2000 IU/daily (over the counter) unless your PCP recommends you take a specific dose  Exercise 3-4 times per week for 45-60 minutes  Limit alcohol to 3-4 drinks per week if you are menopausal  Eat healthy low-fat diet with lots of vegetable & fruits        IMPORTANT INFORMATION REGARDING YOUR RESULTS    If you receive medical information from My [facility] Personal Health Record (online chart) your results will be released into your chart. This means you may view or see results of your biopsy or procedure before I contact you directly. If this occurs, please call the office and we will discuss your results over the phone.     You can see your health information, review clinical summaries from office visits & test results online when you follow your health with MY  Chart, a personal health record. To sign up go to [email or web address]. If you need assistance with signing up or trouble getting into your account call [facility] Patient Line 24/7 at [phone].    My office phone number is [phone] if you need to get in touch with me or have additional questions or concerns. Thank you for choosing [facility] and trusting me as your healthcare provider. I look forward to seeing you again at your next office visit. I am honored to be a provider on your health care team and I remain dedicated to helping you achieve your health goals.

## 2025-09-16 ENCOUNTER — APPOINTMENT (OUTPATIENT)
Dept: PRIMARY CARE | Facility: CLINIC | Age: 60
End: 2025-09-16
Payer: COMMERCIAL

## 2025-10-16 ENCOUNTER — APPOINTMENT (OUTPATIENT)
Dept: GYNECOLOGIC ONCOLOGY | Facility: HOSPITAL | Age: 60
End: 2025-10-16
Payer: COMMERCIAL

## 2025-12-17 ENCOUNTER — APPOINTMENT (OUTPATIENT)
Dept: PRIMARY CARE | Facility: CLINIC | Age: 60
End: 2025-12-17
Payer: COMMERCIAL

## (undated) DEVICE — COVER, CART, 45 X 27 X 48 IN, CLEAR

## (undated) DEVICE — PUNCH, BIOPSY, SKIN, 3 MM, DISPOSABLE, STERILE

## (undated) DEVICE — SUTURE, PDS II, 1, 36 IN, CT-1, VIOLET

## (undated) DEVICE — PREP TRAY, SKIN, DRY, W/GLOVES

## (undated) DEVICE — SYRINGE, 60 CC, LUER LOCK, MONOJECT, W/O CAP, LF

## (undated) DEVICE — DRAPE, PAD, PREP, W/ 9 IN CUFF, 24 X 41, LF, NS

## (undated) DEVICE — LIGASURE, V SEALER/DIVIDER  5MM BLUNT TIP

## (undated) DEVICE — HEMOSTAT, SURGICEL POWDER 3.0GRAMS

## (undated) DEVICE — Device

## (undated) DEVICE — GOWN, SURGICAL, SMARTGOWN, XLARGE, STERILE

## (undated) DEVICE — TOWEL, SURGICAL, NEURO, O/R, 16 X 26, BLUE, STERILE

## (undated) DEVICE — COVER, LIGHT HANDLE, SURGICAL, FLEXIBLE, DISPOSABLE, STERILE

## (undated) DEVICE — SLEEVE, SURGICAL, 21.5 X 5.5 IN, LF, STERILE

## (undated) DEVICE — IRRIGATION SET, CYSTOSCOPY, F/CONSTANT/INTERMITTENT, 8 GTT/CC, 77 IN

## (undated) DEVICE — SPONGE, GAUZE, XRAY DECT, 16 PLY, 4 X 4, W/MASTER DMT,STERILE

## (undated) DEVICE — DRAPE, SHEET, UTILITY, NON ABSORBENT, 18 X 26 IN, LF

## (undated) DEVICE — SYRINGE, 35 CC, LUER LOCK, MONOJECT, W/O CAP, LF

## (undated) DEVICE — APPLICATOR, ENDOSCOPIC, F/SURGICEL POWDER

## (undated) DEVICE — TUBE SET, PNEUMOCLEAR, SMOKE EVACU, HIGH-FLOW

## (undated) DEVICE — POSITIONING, THE PINK PAD, PIGAZZI SYSTEM

## (undated) DEVICE — SYRINGE, LUER LOCK, 12ML

## (undated) DEVICE — MANIFOLD, 4 PORT NEPTUNE STANDARD

## (undated) DEVICE — SUTURE, MONOCRYL, 4-0, 18 IN, PS2, UNDYED

## (undated) DEVICE — COVER, TABLE, 44 X 75 IN, DISPOSABLE, LF, STERILE

## (undated) DEVICE — DRAPE, LEGGINGS, 48 X 31 IN, STERILE, LF

## (undated) DEVICE — HOLSTER, JET SAFETY

## (undated) DEVICE — SUTURE, VICRYL, 0, 27 IN, UR-6, VIOLET

## (undated) DEVICE — RETRACTOR, CERVICAL CUP, VCARE, STANDARD

## (undated) DEVICE — BAG, TISSUE RETRIEVAL, 10MM, ANCHOR

## (undated) DEVICE — ELECTRODE, OPTI2 LAPAROSCOPIC SPATULA, CURVED

## (undated) DEVICE — REST, HEAD, BAGEL, 9 IN

## (undated) DEVICE — TROCAR SYSTEM, BALLOON, KII GELPORT, 12 X 100MM

## (undated) DEVICE — SYRINGE, W/CANNULA, VIAL ACCESS, INTERLINK, W/NEEDLE, 15 G

## (undated) DEVICE — NEEDLE, STIMUPLEX, INSULATED, 21GA X 4IN.

## (undated) DEVICE — DRAPE, UNDERBUTTOCKS

## (undated) DEVICE — BASIN SET, D & C

## (undated) DEVICE — TUBE, SALEM SUMP, 16 FR X 48IN, ENFIT

## (undated) DEVICE — OCCLUDER, COLPO-PNEUMO

## (undated) DEVICE — SYSTEM, FIOS FIRST ENTRY, 5 X 100MM, KII ADVANCED FIXATION

## (undated) DEVICE — CAUTERY, PENCIL, PUSH BUTTON, SMOKE EVAC, 70MM

## (undated) DEVICE — DRESSING, ADHESIVE, ISLAND, TELFA, 2 X 3.75 IN, LF